# Patient Record
Sex: FEMALE | Race: BLACK OR AFRICAN AMERICAN | Employment: OTHER | URBAN - METROPOLITAN AREA
[De-identification: names, ages, dates, MRNs, and addresses within clinical notes are randomized per-mention and may not be internally consistent; named-entity substitution may affect disease eponyms.]

---

## 2017-01-25 ENCOUNTER — OFFICE VISIT (OUTPATIENT)
Dept: PAIN MANAGEMENT | Age: 82
End: 2017-01-25

## 2017-01-25 VITALS — HEART RATE: 67 BPM | SYSTOLIC BLOOD PRESSURE: 139 MMHG | DIASTOLIC BLOOD PRESSURE: 59 MMHG | RESPIRATION RATE: 19 BRPM

## 2017-01-25 DIAGNOSIS — M54.41 CHRONIC MIDLINE LOW BACK PAIN WITH BILATERAL SCIATICA: Primary | ICD-10-CM

## 2017-01-25 DIAGNOSIS — M47.816 LUMBAR FACET ARTHROPATHY: ICD-10-CM

## 2017-01-25 DIAGNOSIS — M25.561 RIGHT KNEE PAIN, UNSPECIFIED CHRONICITY: ICD-10-CM

## 2017-01-25 DIAGNOSIS — M51.36 DDD (DEGENERATIVE DISC DISEASE), LUMBAR: ICD-10-CM

## 2017-01-25 DIAGNOSIS — G89.4 CHRONIC PAIN DISORDER: ICD-10-CM

## 2017-01-25 DIAGNOSIS — G89.29 CHRONIC MIDLINE LOW BACK PAIN WITH BILATERAL SCIATICA: Primary | ICD-10-CM

## 2017-01-25 DIAGNOSIS — M54.42 CHRONIC MIDLINE LOW BACK PAIN WITH BILATERAL SCIATICA: Primary | ICD-10-CM

## 2017-01-25 DIAGNOSIS — M25.551 RIGHT HIP PAIN: ICD-10-CM

## 2017-01-25 DIAGNOSIS — M51.26 LUMBAR DISC HERNIATION: ICD-10-CM

## 2017-01-25 DIAGNOSIS — M15.9 PRIMARY OSTEOARTHRITIS INVOLVING MULTIPLE JOINTS: ICD-10-CM

## 2017-01-25 DIAGNOSIS — M48.061 LUMBAR FORAMINAL STENOSIS: ICD-10-CM

## 2017-01-25 RX ORDER — OXYCODONE AND ACETAMINOPHEN 7.5; 325 MG/1; MG/1
1 TABLET ORAL
Qty: 90 TAB | Refills: 0 | Status: SHIPPED | OUTPATIENT
Start: 2017-02-18 | End: 2017-03-21 | Stop reason: SDUPTHER

## 2017-01-25 RX ORDER — OXYCODONE AND ACETAMINOPHEN 7.5; 325 MG/1; MG/1
1 TABLET ORAL
Qty: 90 TAB | Refills: 0 | Status: ON HOLD | OUTPATIENT
Start: 2017-03-20 | End: 2017-04-17

## 2017-01-25 NOTE — PATIENT INSTRUCTIONS
1. Continue current plan with no evidence of addiction or diversion. Stable on current medication without adverse events. 2. Refill and adjust oxycodone 5/325 mg up to 7.5/325 mg 3 times daily as needed. 3. Continue tizanidine 4 mg up to 3 times daily as needed. 4. Continue to follow-up with orthopedic surgeon as needed. 5. Schedule education class. 6. Extensive counseling regarding self increasing medication. Please avoid any further self increases as this may result in discharge from our practice. 7. Discussed risks of addiction, dependency, and opioid induced hyperalgesia.    8. Return to clinic in 2 months

## 2017-01-25 NOTE — PROGRESS NOTES
Nursing Notes    Patient presents to the office today in follow-up. Reviewed medications with counts as follows:    Rx Date filled Qty Dispensed Pill Count Last Dose Short   Percocet 5/325 1/20/17 90 53 This am. 2 doses Yes. 17 tabs   Ms. Getachew Ventura has a reminder for a \"due or due soon\" health maintenance. I have asked that she contact her primary care provider for follow-up on this health maintenance. POC UDS was not performed in office today    Any new labs or imaging since last appointment? NO    Have you been to an emergency room (ER) or urgent care clinic since your last visit? NO            Have you been hospitalized since your last visit? YES     If yes, where, when, and reason for visit? Right knee surgery at [facility] general for 5 days then rehab for 9 days. Have you seen or consulted any other health care providers outside of the [address] [place] [address]  since your last visit? YES     If yes, where, when, and reason for visit?

## 2017-01-25 NOTE — MR AVS SNAPSHOT
Visit Information Date & Time Provider Department Dept. Phone Encounter #  
 1/25/2017  9:20 AM Jorja Crigler, [address] for Pain Management  Follow-up Instructions Return in about 2 months (around 3/25/2017). Upcoming Health Maintenance Date Due DTaP/Tdap/Td series (1 - Tdap) 4/3/1956 Pneumococcal 65+ Low/Medium Risk (2 of 2 - PPSV23) 11/1/2012 GLAUCOMA SCREENING Q2Y 6/10/2016 INFLUENZA AGE 9 TO ADULT 8/1/2016 MEDICARE YEARLY EXAM 10/29/2016 COLONOSCOPY 6/1/2019 Allergies as of 1/25/2017  Review Complete On: 1/25/2017 By: Geno Young LPN Severity Noted Reaction Type Reactions Forteo [Teriparatide]  09/13/2016    Other (comments)  
 abd pains Lipitor [Atorvastatin]    Other (comments)  
 myalgias Livalo [Pitavastatin]    Other (comments)  
 myalgias Seafood  11/10/2014    Hives Sulfa (Sulfonamide Antibiotics)    Other (comments), Hives  
 hives Sulfur    Other (comments)  
 rash Zocor [Simvastatin]    Other (comments)  
 myalgias Current Immunizations  Reviewed on 8/25/2014 Name Date Pneumococcal Vaccine (Unspecified Type) 11/1/2007 Not reviewed this visit You Were Diagnosed With   
  
 Codes Comments Chronic midline low back pain with bilateral sciatica    -  Primary ICD-10-CM: M54.41, M54.42, G89.29 ICD-9-CM: 724.2, 724.3, 338.29   
 DDD (degenerative disc disease), lumbar     ICD-10-CM: M51.36 
ICD-9-CM: 722.52 Primary osteoarthritis involving multiple joints     ICD-10-CM: M15.0 ICD-9-CM: 715.09 Lumbar disc herniation     ICD-10-CM: M51.26 
ICD-9-CM: 722.10 Lumbar facet arthropathy (HCC)     ICD-10-CM: M12.88 ICD-9-CM: 721.3 Lumbar foraminal stenosis     ICD-10-CM: M99.83 ICD-9-CM: 724.02 Right hip pain     ICD-10-CM: M25.551 ICD-9-CM: 719.45 Right knee pain, unspecified chronicity     ICD-10-CM: M25.561 ICD-9-CM: 719.46   
 Chronic pain disorder     ICD-10-CM: G89.4 ICD-9-CM: 338. 4 Vitals BP Pulse Resp OB Status Smoking Status 139/59 79 19 Hysterectomy Former Smoker Preferred Pharmacy Pharmacy Name Phone Ashley Savage, [address] [place], [place] [address]  [phone] Your Updated Medication List  
  
   
This list is accurate as of: 1/25/17 10:21 AM.  Always use your most recent med list.  
  
  
  
  
 aspirin delayed-release 81 mg tablet Take 1 Tab by mouth daily. calcium-vitamin D 500 mg(1,250mg) -200 unit per tablet Commonly known as:  OYSTER SHELL Take 1 Tab by mouth two (2) times daily (with meals). chlorthalidone 25 mg tablet Commonly known as:  Sueanne Drought Take 1 Tab by mouth daily. cholecalciferol 1,000 unit Cap Commonly known as:  VITAMIN D3 Take 4 Caps by mouth daily. ferrous sulfate 325 mg (65 mg iron) tablet Take 1 Tab by mouth two (2) times daily (with meals). gabapentin 300 mg capsule Commonly known as:  NEURONTIN Take 1 Cap by mouth nightly.  
  
 magnesium citrate solution Take 148 mL by mouth daily as needed for Other (Constipation). omeprazole 20 mg capsule Commonly known as:  PRILOSEC Take 20 mg by mouth daily. * oxyCODONE-acetaminophen 5-325 mg per tablet Commonly known as:  PERCOCET Take 1-2 Tabs by mouth every four (4) hours as needed. Max Daily Amount: 12 Tabs. * oxyCODONE-acetaminophen 7.5-325 mg per tablet Commonly known as:  PERCOCET 7.5 Take 1 Tab by mouth three (3) times daily as needed for Pain for up to 30 days. Max Daily Amount: 3 Tabs. Start taking on:  2/18/2017 * oxyCODONE-acetaminophen 7.5-325 mg per tablet Commonly known as:  PERCOCET 7.5 Take 1 Tab by mouth three (3) times daily as needed for Pain for up to 30 days. Max Daily Amount: 3 Tabs. Start taking on:  3/20/2017 REFRESH LIQUIGEL 1 % Dlgl Generic drug:  carboxymethylcellulose sodium Apply 1 Drop to eye as needed. Indications: DRY EYE  
  
 rivaroxaban 10 mg tablet Commonly known as:  Rendell Spittle Take 1 Tab by mouth daily (with dinner). Indications: KNEE REPLACEMENT DEEP VEIN THROMBOSIS PREVENTION  
  
 * Notice: This list has 3 medication(s) that are the same as other medications prescribed for you. Read the directions carefully, and ask your doctor or other care provider to review them with you. Prescriptions Printed Refills  
 oxyCODONE-acetaminophen (PERCOCET 7.5) 7.5-325 mg per tablet 0 Starting on: 2/18/2017 Sig: Take 1 Tab by mouth three (3) times daily as needed for Pain for up to 30 days. Max Daily Amount: 3 Tabs. Class: Print Route: Oral  
 oxyCODONE-acetaminophen (PERCOCET 7.5) 7.5-325 mg per tablet 0 Starting on: 3/20/2017 Sig: Take 1 Tab by mouth three (3) times daily as needed for Pain for up to 30 days. Max Daily Amount: 3 Tabs. Class: Print Route: Oral  
  
Follow-up Instructions Return in about 2 months (around 3/25/2017). Patient Instructions 1. Continue current plan with no evidence of addiction or diversion. Stable on current medication without adverse events. 2. Refill oxycodone 5/325 mg up to 3 times daily as needed. 3. Continue tizanidine 4 mg up to 3 times daily as needed. 4. Continue to follow-up with orthopedic surgeon as needed. 5. Schedule education class. 6. Extensive counseling regarding self increasing medication. Please avoid any further self increases as this may result in discharge from our practice. 7. Discussed risks of addiction, dependency, and opioid induced hyperalgesia. 8. Return to clinic in 2 months Introducing [facility]! Dear Allan Persons: Thank you for requesting a [employer] account. Our records indicate that you already have an active [employer] account. You can access your account anytime at [email or web address] Did you know that you can access your hospital and ER discharge instructions at any time in [employer]? You can also review all of your test results from your hospital stay or ER visit. Additional Information If you have questions, please visit the Frequently Asked Questions section of the [employer] website at [email or web address] Remember, [employer] is NOT to be used for urgent needs. For medical emergencies, dial 911. Now available from your iPhone and Android! Please provide this summary of care documentation to your next provider. If you have any questions after today's visit, please call [phone].

## 2017-01-25 NOTE — PROGRESS NOTES
HISTORY OF PRESENT ILLNESS  Cecilia Rob is a 80 y.o. female    HPI: Ms. Aguila Donnelly  returns today for f/u of chronic low back with multilevel lumbar degenerative changes. Also has c/o leg and right knee pain. Left TKA in November of 2014. Ms. Aguila Donnelly is here today with her daughter. She is now status post right TKA on 12/9/2016 by [facility] She was given a prescription of oxycodone 10/325 mg that was filled on 1/3/2017 while she was in rehab. No other prescriptions were given according to the patient. She was instructed to take 1-2 pills every 4 hours. When she ran out of that medication she said that she continued with her prescription from our office the same way. Today she is 17 pills short on her Percocet. We had a very lengthy conversation about her medications and current pain management agreement. I explained to her that she must contact our office if she receives any outside prescriptions. I also explained to her that she cannot self increase her medication that she receives from our office. She does continue with knee pain. She is currently going through physical therapy which has exacerbated her knee pain as well as her hip pain. Her chief complaint today is her hip pain that has been aggravated. She is still planning for hip replacement surgery soon. I have agreed to adjust her Percocet up to 7.5/325 mg up to 3 times daily as needed. Extensive counseling was provided to use her medication on an as-needed basis only. She will also have the stretch her medications out to last her until her refill date. She understands and verbally agrees. Current medication management consists of Percocet 5/325 TID PRN and tizanidine 4 mg TID PRN. Medications are helping with pain control and quality of life. Her pain is 6-7/10 with medication and 8/10 without. Pt describes pain as aching and throbbing.  Aggravating factors include standing, bending, and walking. Relieved with rest, medication, and avoiding painful activities. Current treatment is helping to improve general activity, mood, walking, sleep, enjoyment of life    In the past 30 days, the patient reports approximately 20-30% pain relief with current treatment/medications. She  is otherwise doing well with no other complaints today. She denies any adverse events including nausea, vomiting, dizziness, increased constipation, hallucinations, or seizures. Medications were brought to visit today. Pill counts were appropriate         Allergies   Allergen Reactions    Forteo [Teriparatide] Other (comments)     abd pains    Lipitor [Atorvastatin] Other (comments)     myalgias    Livalo [Pitavastatin] Other (comments)     myalgias    Seafood Hives    Sulfa (Sulfonamide Antibiotics) Other (comments) and Hives     hives    Sulfur Other (comments)     rash    Zocor [Simvastatin] Other (comments)     myalgias       Past Surgical History   Procedure Laterality Date    Hx partial hysterectomy       partial    Hx lumbar laminectomy       x2    Hx colonoscopy       2009  repeat 2019    Hx gyn      Hx total abdominal hysterectomy      Pr breast surgery procedure unlisted Bilateral      x2  breast biopsy, benign fibroadenoma    Hx knee arthroscopy       Right knee surgery    Hx knee replacement       2014 left    Hx back surgery           Review of Systems   Constitutional: Negative for chills, fever and weight loss. HENT: Negative for congestion and sore throat. Eyes: Negative for blurred vision and double vision. Respiratory: Negative for cough, shortness of breath and wheezing. Cardiovascular: Negative for chest pain and palpitations. Gastrointestinal: Negative for constipation, diarrhea, heartburn, nausea and vomiting. Genitourinary: Negative. Neurological: Negative for dizziness, seizures and loss of consciousness.    Endo/Heme/Allergies: Does not bruise/bleed easily. Psychiatric/Behavioral: Negative for depression. The patient is not nervous/anxious and does not have insomnia. Physical Exam   Constitutional: She is oriented to person, place, and time and well-developed, well-nourished, and in no distress. No distress. HENT:   Head: Normocephalic and atraumatic. Eyes: EOM are normal.   Pulmonary/Chest: Effort normal.   Musculoskeletal:        Right knee: Tenderness found. Lumbar back: She exhibits decreased range of motion, tenderness and pain. Neurological: She is alert and oriented to person, place, and time. Gait ( ambulating with a cane) abnormal.   Skin: Skin is warm and dry. No rash noted. She is not diaphoretic. No erythema. Psychiatric: Mood, memory, affect and judgment normal.   Nursing note and vitals reviewed. ASSESSMENT:    1. Chronic midline low back pain with bilateral sciatica    2. DDD (degenerative disc disease), lumbar    3. Primary osteoarthritis involving multiple joints    4. Lumbar disc herniation    5. Lumbar facet arthropathy (HCC)    6. Lumbar foraminal stenosis    7. Right hip pain    8. Right knee pain, unspecified chronicity    9. Chronic pain disorder         [place] Prescription Monitoring Program was reviewed which does not demonstrate aberrancies and/or inconsistencies with regard to the historical prescribing of controlled medications to this patient by other providers. PLAN / Pt Instructions:  1. Continue current plan with no evidence of addiction or diversion. Stable on current medication without adverse events. 2. Refill and adjust oxycodone 5/325 mg up to 7.5/325 mg 3 times daily as needed. 3. Continue tizanidine 4 mg up to 3 times daily as needed. 4. Continue to follow-up with orthopedic surgeon as needed. 5. Schedule education class. 6. Extensive counseling regarding self increasing medication.   Please avoid any further self increases as this may result in discharge from our practice. 7. Discussed risks of addiction, dependency, and opioid induced hyperalgesia. 8. Return to clinic in 2 months    Medications Ordered Today   Medications    oxyCODONE-acetaminophen (PERCOCET 7.5) 7.5-325 mg per tablet     Sig: Take 1 Tab by mouth three (3) times daily as needed for Pain for up to 30 days. Max Daily Amount: 3 Tabs. Dispense:  90 Tab     Refill:  0    oxyCODONE-acetaminophen (PERCOCET 7.5) 7.5-325 mg per tablet     Sig: Take 1 Tab by mouth three (3) times daily as needed for Pain for up to 30 days. Max Daily Amount: 3 Tabs. Dispense:  90 Tab     Refill:  0       Pain medications prescribed with the objective of pain relief and improved physical and psychosocial function in this patient. Spent 25 minutes with patient today reviewing the treatment plan, goals of treatment plan, and limitations of the treatment plan, to include the potential for side effects from medications and procedures. Elsa Nieves 1/25/2017      Note: Please excuse any typographical errors. Voice recognition software was used for this note and may cause mistakes.

## 2017-03-21 ENCOUNTER — OFFICE VISIT (OUTPATIENT)
Dept: PAIN MANAGEMENT | Age: 82
End: 2017-03-21

## 2017-03-21 VITALS
WEIGHT: 158 LBS | DIASTOLIC BLOOD PRESSURE: 65 MMHG | HEART RATE: 68 BPM | SYSTOLIC BLOOD PRESSURE: 137 MMHG | BODY MASS INDEX: 29.85 KG/M2

## 2017-03-21 DIAGNOSIS — Z71.89 COUNSELING AND COORDINATION OF CARE: Primary | ICD-10-CM

## 2017-03-21 DIAGNOSIS — Z98.890 S/P LUMBAR LAMINECTOMY: ICD-10-CM

## 2017-03-21 DIAGNOSIS — M17.11 PRIMARY OSTEOARTHRITIS OF RIGHT KNEE: ICD-10-CM

## 2017-03-21 DIAGNOSIS — G89.4 CHRONIC PAIN DISORDER: ICD-10-CM

## 2017-03-21 DIAGNOSIS — M25.551 RIGHT HIP PAIN: ICD-10-CM

## 2017-03-21 DIAGNOSIS — M15.9 PRIMARY OSTEOARTHRITIS INVOLVING MULTIPLE JOINTS: ICD-10-CM

## 2017-03-21 DIAGNOSIS — M51.36 DDD (DEGENERATIVE DISC DISEASE), LUMBAR: ICD-10-CM

## 2017-03-21 DIAGNOSIS — M48.061 LUMBAR FORAMINAL STENOSIS: ICD-10-CM

## 2017-03-21 DIAGNOSIS — M47.816 LUMBAR FACET ARTHROPATHY: ICD-10-CM

## 2017-03-21 DIAGNOSIS — M25.561 RIGHT KNEE PAIN, UNSPECIFIED CHRONICITY: ICD-10-CM

## 2017-03-21 DIAGNOSIS — M51.26 LUMBAR DISC HERNIATION: ICD-10-CM

## 2017-03-21 RX ORDER — OXYCODONE AND ACETAMINOPHEN 7.5; 325 MG/1; MG/1
1 TABLET ORAL
Qty: 90 TAB | Refills: 0 | Status: ON HOLD | OUTPATIENT
Start: 2017-04-22 | End: 2017-04-20

## 2017-03-21 RX ORDER — OXYCODONE AND ACETAMINOPHEN 7.5; 325 MG/1; MG/1
1 TABLET ORAL
Qty: 90 TAB | Refills: 0 | Status: SHIPPED | OUTPATIENT
Start: 2017-03-22 | End: 2017-03-21 | Stop reason: CLARIF

## 2017-03-21 NOTE — MR AVS SNAPSHOT
Visit Information Date & Time Provider Department Dept. Phone Encounter #  
 3/21/2017  9:40 AM JULIETTE Diego [address] for Pain Management [phone] Follow-up Instructions Return in about 2 months (around 5/21/2017). Follow-up and Disposition History Your Appointments 5/11/2017  9:20 AM  
Follow Up with JULIETTE Diego [address] for Pain Management (FINESSE SCHEDULING) Appt Note: Return in about 2 months (around 5/21/2017). [address] [address]  
[phone] [facility] [id number] [id number] Upcoming Health Maintenance Date Due DTaP/Tdap/Td series (1 - Tdap) 4/3/1956 Pneumococcal 65+ Low/Medium Risk (2 of 2 - PPSV23) 11/1/2012 GLAUCOMA SCREENING Q2Y 6/10/2016 INFLUENZA AGE 9 TO ADULT 8/1/2016 MEDICARE YEARLY EXAM 10/29/2016 COLONOSCOPY 6/1/2019 Allergies as of 3/21/2017  Review Complete On: 3/21/2017 By: JULIETTE Diego Severity Noted Reaction Type Reactions Forteo [Teriparatide]  09/13/2016    Other (comments)  
 abd pains Lipitor [Atorvastatin]    Other (comments)  
 myalgias Livalo [Pitavastatin]    Other (comments)  
 myalgias Seafood  11/10/2014    Hives Sulfa (Sulfonamide Antibiotics)    Other (comments), Hives  
 hives Sulfur    Other (comments)  
 rash Zocor [Simvastatin]    Other (comments)  
 myalgias Current Immunizations  Reviewed on 8/25/2014 Name Date Pneumococcal Vaccine (Unspecified Type) 11/1/2007 Not reviewed this visit You Were Diagnosed With   
  
 Codes Comments DDD (degenerative disc disease), lumbar     ICD-10-CM: M51.36 
ICD-9-CM: 722.52 S/P lumbar laminectomy     ICD-10-CM: O54.775 ICD-9-CM: V45.89 Lumbar disc herniation     ICD-10-CM: M51.26 
ICD-9-CM: 722.10 Lumbar facet arthropathy (HCC)     ICD-10-CM: M12.88 ICD-9-CM: 721.3 Lumbar foraminal stenosis     ICD-10-CM: M99.83 ICD-9-CM: 724.02   
 Primary osteoarthritis involving multiple joints     ICD-10-CM: M15.0 ICD-9-CM: 715.09 Primary osteoarthritis of right knee     ICD-10-CM: M17.11 ICD-9-CM: 715.16 Chronic pain disorder     ICD-10-CM: G89.4 ICD-9-CM: 338.4 Osteoarthrosis involving lower leg     ICD-10-CM: M17.9 ICD-9-CM: 715.96 Right knee pain, unspecified chronicity     ICD-10-CM: M25.561 ICD-9-CM: 719.46 Right hip pain     ICD-10-CM: M25.551 ICD-9-CM: 719.45 Vitals BP Pulse Weight(growth percentile) BMI OB Status Smoking Status 137/65 (BP 1 Location: Left arm, BP Patient Position: Sitting) 68 158 lb (71.7 kg) 29.85 kg/m2 Hysterectomy Former Smoker BMI and BSA Data Body Mass Index Body Surface Area  
 29.85 kg/m 2 1.76 m 2 Preferred Pharmacy Pharmacy Name Phone [address] [facility], [address] [place] [address] [phone] Your Updated Medication List  
  
   
This list is accurate as of: 3/21/17 11:27 AM.  Always use your most recent med list.  
  
  
  
  
 aspirin delayed-release 81 mg tablet Take 1 Tab by mouth daily. calcium-vitamin D 500 mg(1,250mg) -200 unit per tablet Commonly known as:  OYSTER SHELL Take 1 Tab by mouth two (2) times daily (with meals). chlorthalidone 25 mg tablet Commonly known as:  Latosha Citizen Take 1 Tab by mouth daily. cholecalciferol 1,000 unit Cap Commonly known as:  VITAMIN D3 Take 4 Caps by mouth daily. ferrous sulfate 325 mg (65 mg iron) tablet Take 1 Tab by mouth two (2) times daily (with meals). gabapentin 300 mg capsule Commonly known as:  NEURONTIN Take 1 Cap by mouth nightly.  
  
 magnesium citrate solution Take 148 mL by mouth daily as needed for Other (Constipation). omeprazole 20 mg capsule Commonly known as:  PRILOSEC Take 20 mg by mouth daily. * oxyCODONE-acetaminophen 5-325 mg per tablet Commonly known as:  PERCOCET Take 1-2 Tabs by mouth every four (4) hours as needed. Max Daily Amount: 12 Tabs. * oxyCODONE-acetaminophen 7.5-325 mg per tablet Commonly known as:  PERCOCET 7.5 Take 1 Tab by mouth three (3) times daily as needed for Pain for up to 30 days. Max Daily Amount: 3 Tabs. * oxyCODONE-acetaminophen 7.5-325 mg per tablet Commonly known as:  PERCOCET 7.5 Take 1 Tab by mouth three (3) times daily as needed for Pain for up to 30 days. Max Daily Amount: 3 Tabs. Start taking on:  4/22/2017 REFRESH LIQUIGEL 1 % Dlgl Generic drug:  carboxymethylcellulose sodium Apply 1 Drop to eye as needed. Indications: DRY EYE  
  
 rivaroxaban 10 mg tablet Commonly known as:  Shackelford Honor Take 1 Tab by mouth daily (with dinner). Indications: KNEE REPLACEMENT DEEP VEIN THROMBOSIS PREVENTION  
  
 * Notice: This list has 3 medication(s) that are the same as other medications prescribed for you. Read the directions carefully, and ask your doctor or other care provider to review them with you. Prescriptions Printed Refills  
 oxyCODONE-acetaminophen (PERCOCET 7.5) 7.5-325 mg per tablet 0 Starting on: 4/22/2017 Sig: Take 1 Tab by mouth three (3) times daily as needed for Pain for up to 30 days. Max Daily Amount: 3 Tabs. Class: Print Route: Oral  
  
Follow-up Instructions Return in about 2 months (around 5/21/2017). To-Do List   
 04/17/2017 8:00 AM  
  Appointment with [address] [place] [address] PSAT RM 1 at [address] [place] [address] ([phone]) Patient Instructions 1. Continue current plan with no evidence of addiction or diversion. Stable on current medication without adverse events. 2. Refill oxycodone 7.5/325 mg 3 times daily as needed. 3. DISContinue tizanidine 4 mg 4. Continue to follow-up with orthopedic surgeon regarding upcoming hip surgery. 5. Education class today. 6. Discussed risks of addiction, dependency, and opioid induced hyperalgesia. 7. Return to clinic in 2 months Introducing [facility]! Dear Hira Hays: Thank you for requesting a [employer] account. Our records indicate that you already have an active [employer] account. You can access your account anytime at [email or web address] [email or web address] Did you know that you can access your hospital and ER discharge instructions at any time in [employer]? You can also review all of your test results from your hospital stay or ER visit. Additional Information If you have questions, please visit the Frequently Asked Questions section of the [employer] website at [email or web address] Remember, [employer] is NOT to be used for urgent needs. For medical emergencies, dial 911. Now available from your iPhone and Android! Please provide this summary of care documentation to your next provider. If you have any questions after today's visit, please call [phone].

## 2017-03-21 NOTE — PROGRESS NOTES
HISTORY OF PRESENT ILLNESS  Adrienne Martins is a 80 y.o. female    HPI: Ms. Bayron Parker  returns today for f/u of chronic low back with multilevel lumbar degenerative changes. Also has c/o leg and right knee pain. She is S/P right TKA on 12/9/2016 by Dr. Zafar Garcia.  Left TKA in November of 2014. Ms. Bayron Parker she continues unchanged since last visit. She is reports some improvement since adjusting her Percocet up to 7.5/325 mg. She has been using this medication on an as needed basis. She has plenty of her medication left over today. We will adjust her fill dates accordingly. She does report that she is scheduled for hip replacement surgery next month. We discussed postsurgical treatment plan  in detail again today. She understands to contact our office within 72 hours if she receives any outside controlled substance for pain. She is recovering well from her recent right TKA. Current medication management consists of Percocet 7.5/325 TID PRN  Medications are helping with pain control and quality of life. Her pain is 6-7/10 with medication and 8/10 without. Pt describes pain as aching and throbbing. Aggravating factors include standing, bending, and walking. Relieved with rest, medication, and avoiding painful activities. Current treatment is helping to improve general activity, mood, walking, sleep, enjoyment of life    In the past 30 days, the patient reports approximately 20-30% pain relief with current treatment/medications. She  is otherwise doing well with no other complaints today. She denies any adverse events including nausea, vomiting, dizziness, increased constipation, hallucinations, or seizures.             Allergies   Allergen Reactions    Forteo [Teriparatide] Other (comments)     abd pains    Lipitor [Atorvastatin] Other (comments)     myalgias    Livalo [Pitavastatin] Other (comments)     myalgias    Seafood Hives    Sulfa (Sulfonamide Antibiotics) Other (comments) and Hives     hives    Sulfur Other (comments)     rash    Zocor [Simvastatin] Other (comments)     myalgias       Past Surgical History:   Procedure Laterality Date    BREAST SURGERY PROCEDURE UNLISTED Bilateral     x2  breast biopsy, benign fibroadenoma    HX BACK SURGERY      HX COLONOSCOPY      2009  repeat 2019    HX GYN      HX KNEE ARTHROSCOPY      Right knee surgery    HX KNEE REPLACEMENT      2014 left    HX LUMBAR LAMINECTOMY      x2    HX PARTIAL HYSTERECTOMY      partial    HX TOTAL ABDOMINAL HYSTERECTOMY           Review of Systems   Constitutional: Negative for chills, fever and weight loss. HENT: Negative for congestion and sore throat. Eyes: Negative for blurred vision and double vision. Respiratory: Negative for cough, shortness of breath and wheezing. Cardiovascular: Negative for chest pain and palpitations. Gastrointestinal: Negative for constipation, diarrhea, heartburn, nausea and vomiting. Genitourinary: Negative. Neurological: Negative for dizziness, seizures and loss of consciousness. Endo/Heme/Allergies: Does not bruise/bleed easily. Psychiatric/Behavioral: Negative for depression. The patient is not nervous/anxious and does not have insomnia. Physical Exam   Constitutional: She is oriented to person, place, and time and well-developed, well-nourished, and in no distress. No distress. HENT:   Head: Normocephalic and atraumatic. Eyes: EOM are normal.   Pulmonary/Chest: Effort normal.   Musculoskeletal:        Right knee: Tenderness found. Lumbar back: She exhibits decreased range of motion, tenderness and pain. Neurological: She is alert and oriented to person, place, and time. Gait ( ambulating with a cane) abnormal.   Skin: Skin is warm and dry. No rash noted. She is not diaphoretic. No erythema. Psychiatric: Mood, memory, affect and judgment normal.   Nursing note and vitals reviewed.       ASSESSMENT:    1. DDD (degenerative disc disease), lumbar    2. S/P lumbar laminectomy    3. Lumbar disc herniation    4. Lumbar facet arthropathy ([address]    5. Lumbar foraminal stenosis    6. Primary osteoarthritis involving multiple joints    7. Primary osteoarthritis of right knee    8. Chronic pain disorder    9. Osteoarthrosis involving lower leg    10. Right knee pain, unspecified chronicity    11. Right hip pain         [place] Prescription Monitoring Program was reviewed which does not demonstrate aberrancies and/or inconsistencies with regard to the historical prescribing of controlled medications to this patient by other providers. PLAN / Pt Instructions:  1. Continue current plan with no evidence of addiction or diversion. Stable on current medication without adverse events. 2. Refill oxycodone 7.5/325 mg 3 times daily as needed. 3. DISContinue tizanidine 4 mg   4. Continue to follow-up with orthopedic surgeon regarding upcoming hip surgery. 5. Education class today. 6. Discussed risks of addiction, dependency, and opioid induced hyperalgesia. 7. Return to clinic in 2 months    Medications Ordered Today   Medications    DISCONTD: oxyCODONE-acetaminophen (PERCOCET 7.5) 7.5-325 mg per tablet     Sig: Take 1 Tab by mouth three (3) times daily as needed for Pain for up to 30 days. Max Daily Amount: 3 Tabs. Dispense:  90 Tab     Refill:  0    oxyCODONE-acetaminophen (PERCOCET 7.5) 7.5-325 mg per tablet     Sig: Take 1 Tab by mouth three (3) times daily as needed for Pain for up to 30 days. Max Daily Amount: 3 Tabs. Dispense:  90 Tab     Refill:  0       Pain medications prescribed with the objective of pain relief and improved physical and psychosocial function in this patient. Spent 25 minutes with patient today reviewing the treatment plan, goals of treatment plan, and limitations of the treatment plan, to include the potential for side effects from medications and procedures.         JULIETTE Barrios 3/21/2017      Note: Please excuse any typographical errors. Voice recognition software was used for this note and may cause mistakes.

## 2017-03-21 NOTE — PATIENT INSTRUCTIONS
1. Continue current plan with no evidence of addiction or diversion. Stable on current medication without adverse events. 2. Refill oxycodone 7.5/325 mg 3 times daily as needed. 3. DISContinue tizanidine 4 mg   4. Continue to follow-up with orthopedic surgeon regarding upcoming hip surgery. 5. Education class today. 6. Discussed risks of addiction, dependency, and opioid induced hyperalgesia.    7. Return to clinic in 2 months

## 2017-03-21 NOTE — PROGRESS NOTES
Ms. Freedom Cheung attended the Education Class facilitated by Shaylee Lovett RN Clinical Supervisor. Objectives of this class are to review practice policies, protocols and the Controlled Substances Consent and Treatment Agreement, discuss \"what if\" scenarios, introduce staff, and provide an opportunity for questions and answers. Ultimately, goals for this class are for Ms. Freedom Cheung to:    · Be educated - Learn as much as possible about her pain and related treatment, our policies and the Controlled Substances Agreement. · Be responsible - Follow the providers advice regarding treatment recommendations, medications, and prescription information. · Be confident - Better manage her pain and return to a more functional lifestyle. At least 45 minutes was spent with the patient in face-to-face contact today.

## 2017-03-21 NOTE — PROGRESS NOTES
Nursing Notes    Patient presents to the office today in follow-up. Patient rates her pain at 5/10 on the numerical pain scale. Reviewed medications with counts as follows:    Rx Date filled Qty Dispensed Pill Count Last Dose Short     Percocet 7.5/325mg  02/18/17 90 8 This am  No    Percocet 7.5/325mg  03/20/17 90 90 90 n/a                                    Comments:     POC UDS was not performed in office today    Any new labs or imaging since last appointment? NO    Have you been to an emergency room (ER) or urgent care clinic since your last visit? NO            Have you been hospitalized since your last visit? NO     If yes, where, when, and reason for visit? Have you seen or consulted any other health care providers outside of the [address] [place] [address]  since your last visit? YES     If yes, where, when, and reason for visit? Orthopedist       HM deferred to pcp.

## 2017-04-17 ENCOUNTER — DOCUMENTATION ONLY (OUTPATIENT)
Dept: PAIN MANAGEMENT | Age: 82
End: 2017-04-17

## 2017-04-17 PROBLEM — M16.9 OA (OSTEOARTHRITIS) OF HIP: Status: ACTIVE | Noted: 2017-04-17

## 2017-04-17 PROBLEM — M16.11 OSTEOARTHRITIS OF RIGHT HIP: Status: ACTIVE | Noted: 2017-04-17

## 2017-05-16 ENCOUNTER — TELEPHONE (OUTPATIENT)
Dept: CARDIOLOGY CLINIC | Age: 82
End: 2017-05-16

## 2017-05-16 NOTE — TELEPHONE ENCOUNTER
LMOM to sched 6 month f/u in May per matt sheets pt. Left [facility] and  phone numbers.

## 2017-05-22 ENCOUNTER — TELEPHONE (OUTPATIENT)
Dept: CARDIOLOGY CLINIC | Age: 82
End: 2017-05-22

## 2017-06-14 ENCOUNTER — OFFICE VISIT (OUTPATIENT)
Dept: PAIN MANAGEMENT | Age: 82
End: 2017-06-14

## 2017-06-14 VITALS
SYSTOLIC BLOOD PRESSURE: 160 MMHG | WEIGHT: 150 LBS | BODY MASS INDEX: 28.32 KG/M2 | HEART RATE: 67 BPM | HEIGHT: 61 IN | DIASTOLIC BLOOD PRESSURE: 70 MMHG

## 2017-06-14 DIAGNOSIS — M51.36 DDD (DEGENERATIVE DISC DISEASE), LUMBAR: ICD-10-CM

## 2017-06-14 DIAGNOSIS — M16.11 OSTEOARTHRITIS OF RIGHT HIP, UNSPECIFIED OSTEOARTHRITIS TYPE: ICD-10-CM

## 2017-06-14 DIAGNOSIS — M48.061 LUMBAR FORAMINAL STENOSIS: ICD-10-CM

## 2017-06-14 DIAGNOSIS — M54.41 CHRONIC MIDLINE LOW BACK PAIN WITH BILATERAL SCIATICA: ICD-10-CM

## 2017-06-14 DIAGNOSIS — M15.9 PRIMARY OSTEOARTHRITIS INVOLVING MULTIPLE JOINTS: ICD-10-CM

## 2017-06-14 DIAGNOSIS — M25.551 RIGHT HIP PAIN: ICD-10-CM

## 2017-06-14 DIAGNOSIS — M17.11 PRIMARY OSTEOARTHRITIS OF RIGHT KNEE: ICD-10-CM

## 2017-06-14 DIAGNOSIS — M54.42 CHRONIC MIDLINE LOW BACK PAIN WITH BILATERAL SCIATICA: ICD-10-CM

## 2017-06-14 DIAGNOSIS — Z79.899 ENCOUNTER FOR LONG-TERM (CURRENT) USE OF HIGH-RISK MEDICATION: Primary | ICD-10-CM

## 2017-06-14 DIAGNOSIS — G89.29 CHRONIC MIDLINE LOW BACK PAIN WITH BILATERAL SCIATICA: ICD-10-CM

## 2017-06-14 LAB
ALCOHOL UR POC: NORMAL
AMPHETAMINES UR POC: NEGATIVE
BARBITURATES UR POC: NORMAL
BENZODIAZEPINES UR POC: NEGATIVE
BUPRENORPHINE UR POC: NORMAL
CANNABINOIDS UR POC: NORMAL
CARISOPRODOL UR POC: NEGATIVE
COCAINE UR POC: NEGATIVE
FENTANYL UR POC: NORMAL
MDMA/ECSTASY UR POC: NORMAL
METHADONE UR POC: NEGATIVE
METHAMPHETAMINE UR POC: NEGATIVE
METHYLPHENIDATE UR POC: NEGATIVE
OPIATES UR POC: NEGATIVE
OXYCODONE UR POC: NEGATIVE
PHENCYCLIDINE UR POC: NORMAL
PROPOXYPHENE UR POC: NORMAL
TRAMADOL UR POC: NORMAL
TRICYCLICS UR POC: NORMAL

## 2017-06-14 RX ORDER — OXYCODONE AND ACETAMINOPHEN 7.5; 325 MG/1; MG/1
1 TABLET ORAL
Qty: 90 TAB | Refills: 0 | Status: SHIPPED | OUTPATIENT
Start: 2017-07-13 | End: 2017-08-09 | Stop reason: SDUPTHER

## 2017-06-14 RX ORDER — OXYCODONE AND ACETAMINOPHEN 7.5; 325 MG/1; MG/1
1 TABLET ORAL
Qty: 90 TAB | Refills: 0 | Status: SHIPPED | OUTPATIENT
Start: 2017-06-14 | End: 2017-08-09 | Stop reason: SDUPTHER

## 2017-06-14 NOTE — PROGRESS NOTES
Nursing Notes    Patient presents to the office today in follow-up. Patient rates her pain at 0/0 on the numerical pain scale. Reviewed medications with counts as follows:    Rx Date filled Qty Dispensed Pill Count Last Dose Short   PERCOCET 7.5-325MG 05/09/17 90 1 yesterday no                                          Comments:     POC UDS was performed in office today    Any new labs or imaging since last appointment? YES,labs and xray    Have you been to an emergency room (ER) or urgent care clinic since your last visit? NO            Have you been hospitalized since your last visit? [facility] for surgery on right knee and hip     If yes, where, when, and reason for visit? Have you seen or consulted any other health care providers outside of the [address] [place] [address]  since your last visit? YES, orthopedic     If yes, where, when, and reason for visit? HM deferred to pcp.

## 2017-06-14 NOTE — MR AVS SNAPSHOT
Visit Information Date & Time Provider Department Dept. Phone Encounter #  
 6/14/2017 10:00 AM Kat Esquivel, [address] [place] [address] for Pain Management [id number] Follow-up Instructions Return in about 2 months (around 8/14/2017). Your Appointments 7/12/2017  1:30 PM  
Follow Up with Magnus Gloria MD  
Cardio Specialist at [facility]) Appt Note: dx 6mos f/u appt; 6mos f/u appt [id number] [facility] [address] [facility] [address]  
  
   
 [address] [facility] 1334 Upcoming Health Maintenance Date Due DTaP/Tdap/Td series (1 - Tdap) 4/3/1956 Pneumococcal 65+ Low/Medium Risk (2 of 2 - PPSV23) 11/1/2012 GLAUCOMA SCREENING Q2Y 6/10/2016 MEDICARE YEARLY EXAM 10/29/2016 INFLUENZA AGE 9 TO ADULT 8/1/2017 COLONOSCOPY 6/1/2019 Allergies as of 6/14/2017  Review Complete On: 6/14/2017 By: JULIETTE Ruby Severity Noted Reaction Type Reactions Forteo [Teriparatide]  09/13/2016    Other (comments), Nausea and Vomiting  
 abd pains 
abd pains Lipitor [Atorvastatin]  02/03/2017    Other (comments), Nausea and Vomiting  
 myalgias 
myalgias Livalo [Pitavastatin]  02/03/2017    Other (comments), Nausea and Vomiting  
 myalgias 
myalgias Seafood  07/03/2011   Systemic Hives Sulfa (Sulfonamide Antibiotics)  10/11/2007    Other (comments), Hives  
 hives Sulfur    Other (comments)  
 rash Zocor [Simvastatin]  02/03/2017    Other (comments), Nausea and Vomiting  
 myalgias 
myalgias Current Immunizations  Reviewed on 8/25/2014 Name Date Pneumococcal Vaccine (Unspecified Type) 11/1/2007 Not reviewed this visit You Were Diagnosed With   
  
 Codes Comments Encounter for long-term (current) use of high-risk medication    -  Primary ICD-10-CM: X95.702 ICD-9-CM: V58.69 DDD (degenerative disc disease), lumbar     ICD-10-CM: M51.36 
ICD-9-CM: 722.52 Lumbar foraminal stenosis     ICD-10-CM: M99.83 ICD-9-CM: 724.02   
 Primary osteoarthritis involving multiple joints     ICD-10-CM: M15.0 ICD-9-CM: 715.09 Primary osteoarthritis of right knee     ICD-10-CM: M17.11 ICD-9-CM: 715.16 Right hip pain     ICD-10-CM: M25.551 ICD-9-CM: 719.45 Chronic midline low back pain with bilateral sciatica     ICD-10-CM: M54.41, M54.42, G89.29 ICD-9-CM: 724.2, 724.3, 338.29 Osteoarthritis of right hip, unspecified osteoarthritis type     ICD-10-CM: M16.11 
ICD-9-CM: 715.95 Vitals BP Pulse Height(growth percentile) Weight(growth percentile) BMI OB Status 160/70 67 5' 1\" (1.549 m) 150 lb (68 kg) 28.34 kg/m2 Hysterectomy Smoking Status Former Smoker BMI and BSA Data Body Mass Index Body Surface Area  
 28.34 kg/m 2 1.71 m 2 Preferred Pharmacy Pharmacy Name Phone [facility], [address] [place] [address] [phone] Your Updated Medication List  
  
   
This list is accurate as of: 6/14/17 10:47 AM.  Always use your most recent med list.  
  
  
  
  
 aspirin delayed-release 81 mg tablet Take 1 Tab by mouth daily. Start 5/1/2017  
  
 calcium-vitamin D 500 mg(1,250mg) -200 unit per tablet Commonly known as:  OYSTER SHELL Take 1 Tab by mouth two (2) times daily (with meals). chlorthalidone 25 mg tablet Commonly known as:  Jearldine Soulier Take 1 Tab by mouth daily. cholecalciferol 1,000 unit Cap Commonly known as:  VITAMIN D3 Take 4 Caps by mouth daily. ferrous sulfate 325 mg (65 mg iron) tablet Take 1 Tab by mouth two (2) times daily (with meals). gabapentin 300 mg capsule Commonly known as:  NEURONTIN Take 1 Cap by mouth nightly.  
  
 magnesium citrate solution Take 148 mL by mouth daily as needed for Other (Constipation). omeprazole 20 mg capsule Commonly known as:  PRILOSEC Take 20 mg by mouth daily. * oxyCODONE-acetaminophen 7.5-325 mg per tablet Commonly known as:  PERCOCET 7.5 Take 1 Tab by mouth three (3) times daily as needed for Pain for up to 30 days. Max Daily Amount: 3 Tabs. * oxyCODONE-acetaminophen 7.5-325 mg per tablet Commonly known as:  PERCOCET 7.5 Take 1 Tab by mouth three (3) times daily as needed for Pain for up to 30 days. Max Daily Amount: 3 Tabs. Start taking on:  7/13/2017 REFRESH LIQUIGEL 1 % Dlgl Generic drug:  carboxymethylcellulose sodium Apply 1 Drop to eye as needed. Indications: DRY EYE  
  
 * Notice: This list has 2 medication(s) that are the same as other medications prescribed for you. Read the directions carefully, and ask your doctor or other care provider to review them with you. Prescriptions Printed Refills  
 oxyCODONE-acetaminophen (PERCOCET 7.5) 7.5-325 mg per tablet 0 Sig: Take 1 Tab by mouth three (3) times daily as needed for Pain for up to 30 days. Max Daily Amount: 3 Tabs. Class: Print Route: Oral  
 oxyCODONE-acetaminophen (PERCOCET 7.5) 7.5-325 mg per tablet 0 Starting on: 7/13/2017 Sig: Take 1 Tab by mouth three (3) times daily as needed for Pain for up to 30 days. Max Daily Amount: 3 Tabs. Class: Print Route: Oral  
  
We Performed the Following AMB POC DRUG SCREEN () [ [facility]] DRUG SCREEN [[id number] Custom] Follow-up Instructions Return in about 2 months (around 8/14/2017). Patient Instructions 1. Continue current plan with no evidence of addiction or diversion. Stable on current medication without adverse events. 2. Refill oxycodone 7.5/325 mg 3 times daily as needed. 3. Continue PT for right hip 4. Continue to follow-up with orthopedic surgeon regarding right hip 5. Discussed risks of addiction, dependency, and opioid induced hyperalgesia. 6. Return to clinic in 2 months Introducing [facility]! Dear Omar Mullen: Thank you for requesting a [employer] account. Our records indicate that you already have an active [employer] account. You can access your account anytime at [email or web address] Did you know that you can access your hospital and ER discharge instructions at any time in [employer]? You can also review all of your test results from your hospital stay or ER visit. Additional Information If you have questions, please visit the Frequently Asked Questions section of the Fan[employer] website at [email or web address] Remember, [employer] is NOT to be used for urgent needs. For medical emergencies, dial 911. Now available from your iPhone and Android! Please provide this summary of care documentation to your next provider. Your primary care clinician is listed as Alexy Blanton. If you have any questions after today's visit, please call [phone].

## 2017-06-14 NOTE — PROGRESS NOTES
HISTORY OF PRESENT ILLNESS  Becka Whiteside is a [de-identified] y.o. female    HPI: Ms. Leandra Coyle  returns today for f/u of chronic low back with multilevel lumbar degenerative changes. Also has c/o leg and right knee pain. She is S/P right TKA on 12/9/2016 by Dr. [place] Ham.  Left TKA in November of 2014. Ms. Leandra Coyle underwent right hip surgery on 4/17/2017 by Dr. Jason Ortiz. She reports significant improvement with her surgery. She was treated with pain medication while in the hospital but did not receive any prescriptions during discharge. She is currently in physical therapy and doing well so far. She is very happy with her progress. She still uses her walker to help protect her new hip but is able to ambulate much better than prior to her surgery. We will continue with her current treatment plan for now with no changes. I will have her follow-up in 2 months for reassessment. Current medication management consists of Percocet 7.5/325 TID PRN  Medications are helping with pain control and quality of life. Her pain is 6-7/10 with medication and 8/10 without. Pt describes pain as aching and throbbing. Aggravating factors include standing, bending, and walking. Relieved with rest, medication, and avoiding painful activities. Current treatment is helping to improve general activity, mood, walking, sleep, enjoyment of life    In the past 30 days, the patient reports approximately 20-30% pain relief with current treatment/medications. She  is otherwise doing well with no other complaints today. She denies any adverse events including nausea, vomiting, dizziness, increased constipation, hallucinations, or seizures. POC UDS today. Confirmation pending.          Allergies   Allergen Reactions    Forteo [Teriparatide] Other (comments) and Nausea and Vomiting     abd pains  abd pains    Lipitor [Atorvastatin] Other (comments) and Nausea and Vomiting     myalgias  myalgias    Livalo [Pitavastatin] Other (comments) and Nausea and Vomiting     myalgias  myalgias    Seafood Hives    Sulfa (Sulfonamide Antibiotics) Other (comments) and Hives     hives    Sulfur Other (comments)     rash    Zocor [Simvastatin] Other (comments) and Nausea and Vomiting     myalgias  myalgias       Past Surgical History:   Procedure Laterality Date    BREAST SURGERY PROCEDURE UNLISTED Bilateral     x2  breast biopsy, benign fibroadenoma    HX BACK SURGERY      HX COLONOSCOPY      2009  repeat 2019    HX GYN      HX KNEE ARTHROSCOPY      Right knee surgery    HX KNEE REPLACEMENT      2014 left    HX KNEE REPLACEMENT Right 12/2016    HX LUMBAR LAMINECTOMY      x2    HX PARTIAL HYSTERECTOMY      partial    HX TOTAL ABDOMINAL HYSTERECTOMY           Review of Systems   Constitutional: Negative for chills, fever and weight loss. HENT: Negative for congestion and sore throat. Eyes: Negative for blurred vision and double vision. Respiratory: Negative for cough, shortness of breath and wheezing. Cardiovascular: Negative for chest pain and palpitations. Gastrointestinal: Negative for constipation, diarrhea, heartburn, nausea and vomiting. Genitourinary: Negative. Neurological: Negative for dizziness, seizures and loss of consciousness. Endo/Heme/Allergies: Does not bruise/bleed easily. Psychiatric/Behavioral: Negative for depression. The patient is not nervous/anxious and does not have insomnia. Physical Exam   Constitutional: She is oriented to person, place, and time and well-developed, well-nourished, and in no distress. No distress. HENT:   Head: Normocephalic and atraumatic. Eyes: EOM are normal.   Pulmonary/Chest: Effort normal.   Musculoskeletal:        Right knee: Tenderness found. Lumbar back: She exhibits decreased range of motion, tenderness and pain. Neurological: She is alert and oriented to person, place, and time.  Gait ( ambulating with a cane) abnormal. Skin: Skin is warm and dry. No rash noted. She is not diaphoretic. No erythema. Psychiatric: Mood, memory, affect and judgment normal.   Nursing note and vitals reviewed. ASSESSMENT:    1. Encounter for long-term (current) use of high-risk medication    2. DDD (degenerative disc disease), lumbar    3. Lumbar foraminal stenosis    4. Primary osteoarthritis involving multiple joints    5. Primary osteoarthritis of right knee    6. Right hip pain    7. Chronic midline low back pain with bilateral sciatica    8. Osteoarthritis of right hip, unspecified osteoarthritis type         [place] Prescription Monitoring Program was reviewed which does not demonstrate aberrancies and/or inconsistencies with regard to the historical prescribing of controlled medications to this patient by other providers. PLAN / Pt Instructions:  1. Continue current plan with no evidence of addiction or diversion. Stable on current medication without adverse events. 2. Refill oxycodone 7.5/325 mg 3 times daily as needed. 3. Continue PT for right hip  4. Continue to follow-up with orthopedic surgeon regarding right hip   5. Discussed risks of addiction, dependency, and opioid induced hyperalgesia. 6. Return to clinic in 2 months    Medications Ordered Today   Medications    oxyCODONE-acetaminophen (PERCOCET 7.5) 7.5-325 mg per tablet     Sig: Take 1 Tab by mouth three (3) times daily as needed for Pain for up to 30 days. Max Daily Amount: 3 Tabs. Dispense:  90 Tab     Refill:  0    oxyCODONE-acetaminophen (PERCOCET 7.5) 7.5-325 mg per tablet     Sig: Take 1 Tab by mouth three (3) times daily as needed for Pain for up to 30 days. Max Daily Amount: 3 Tabs. Dispense:  90 Tab     Refill:  0       Pain medications prescribed with the objective of pain relief and improved physical and psychosocial function in this patient.     Spent 25 minutes with patient today reviewing the treatment plan, goals of treatment plan, and limitations of the treatment plan, to include the potential for side effects from medications and procedures. Elsa Arias 6/14/2017      Note: Please excuse any typographical errors. Voice recognition software was used for this note and may cause mistakes.

## 2017-06-28 ENCOUNTER — TELEPHONE (OUTPATIENT)
Dept: CARDIOLOGY CLINIC | Age: 82
End: 2017-06-28

## 2017-07-03 ENCOUNTER — TELEPHONE (OUTPATIENT)
Dept: CARDIOLOGY CLINIC | Age: 82
End: 2017-07-03

## 2017-08-09 ENCOUNTER — OFFICE VISIT (OUTPATIENT)
Dept: PAIN MANAGEMENT | Age: 82
End: 2017-08-09

## 2017-08-09 VITALS
SYSTOLIC BLOOD PRESSURE: 135 MMHG | RESPIRATION RATE: 16 BRPM | HEART RATE: 66 BPM | WEIGHT: 150 LBS | DIASTOLIC BLOOD PRESSURE: 69 MMHG | OXYGEN SATURATION: 100 % | TEMPERATURE: 97.6 F | BODY MASS INDEX: 28.34 KG/M2

## 2017-08-09 DIAGNOSIS — M54.42 CHRONIC MIDLINE LOW BACK PAIN WITH BILATERAL SCIATICA: Primary | ICD-10-CM

## 2017-08-09 DIAGNOSIS — G89.29 CHRONIC MIDLINE LOW BACK PAIN WITH BILATERAL SCIATICA: Primary | ICD-10-CM

## 2017-08-09 DIAGNOSIS — M54.41 CHRONIC MIDLINE LOW BACK PAIN WITH BILATERAL SCIATICA: Primary | ICD-10-CM

## 2017-08-09 DIAGNOSIS — M47.816 LUMBAR FACET ARTHROPATHY: ICD-10-CM

## 2017-08-09 DIAGNOSIS — M17.11 PRIMARY OSTEOARTHRITIS OF RIGHT KNEE: ICD-10-CM

## 2017-08-09 DIAGNOSIS — M51.36 DDD (DEGENERATIVE DISC DISEASE), LUMBAR: ICD-10-CM

## 2017-08-09 DIAGNOSIS — M48.061 LUMBAR FORAMINAL STENOSIS: ICD-10-CM

## 2017-08-09 DIAGNOSIS — M16.11 OSTEOARTHRITIS OF RIGHT HIP, UNSPECIFIED OSTEOARTHRITIS TYPE: ICD-10-CM

## 2017-08-09 DIAGNOSIS — M25.561 RIGHT KNEE PAIN, UNSPECIFIED CHRONICITY: ICD-10-CM

## 2017-08-09 DIAGNOSIS — M15.9 PRIMARY OSTEOARTHRITIS INVOLVING MULTIPLE JOINTS: ICD-10-CM

## 2017-08-09 DIAGNOSIS — G89.4 CHRONIC PAIN DISORDER: ICD-10-CM

## 2017-08-09 RX ORDER — OXYCODONE AND ACETAMINOPHEN 7.5; 325 MG/1; MG/1
1 TABLET ORAL
Qty: 90 TAB | Refills: 0 | Status: SHIPPED | OUTPATIENT
Start: 2017-09-13 | End: 2017-10-05 | Stop reason: SDUPTHER

## 2017-08-09 RX ORDER — OXYCODONE AND ACETAMINOPHEN 7.5; 325 MG/1; MG/1
1 TABLET ORAL
Qty: 90 TAB | Refills: 0 | Status: SHIPPED | OUTPATIENT
Start: 2017-08-14 | End: 2017-10-05 | Stop reason: SDUPTHER

## 2017-08-09 NOTE — PROGRESS NOTES
HISTORY OF PRESENT ILLNESS  Meaghan Reyes is a [de-identified] y.o. female    HPI: Ms. Giana Villatoro  returns today for f/u of chronic low back with multilevel lumbar degenerative changes. Also has c/o leg and right knee pain. 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 Suraj Haney. Left TKA in November of 2014 by Dr. Alicia Lane. Ms. Giana Villatoro reports some improvement since her last visit. She underwent right JUDITH on 4/17/2017 by Dr. Diana cameron. She is currently finished with physical therapy and doing well. She is no longer using her walker and only uses her cane when she leaves the house. She is happy with her progress so far. She has still been using her Percocet but not as often. I have advised her that she may use half to 1 tablet on an as-needed basis. We will see how she does before next visit. Hopefully we will be able to taper down on her Percocet. I will have her follow-up in 2 months for reassessment. Current medication management consists of Percocet 7.5/325 TID PRN  Medications are helping with pain control and quality of life. Her pain is 6-7/10 with medication and 8/10 without. Pt describes pain as aching and throbbing. Aggravating factors include standing, bending, and walking. Relieved with rest, medication, and avoiding painful activities. Current treatment is helping to improve general activity, mood, walking, sleep, enjoyment of life    In the past 30 days, the patient reports approximately 20-30% pain relief with current treatment/medications. She  is otherwise doing well with no other complaints today. She denies any adverse events including nausea, vomiting, dizziness, increased constipation, hallucinations, or seizures.           Allergies   Allergen Reactions    Forteo [Teriparatide] Other (comments) and Nausea and Vomiting     abd pains  abd pains    Lipitor [Atorvastatin] Other (comments) and Nausea and Vomiting     myalgias  myalgias  Livalo [Pitavastatin] Other (comments) and Nausea and Vomiting     myalgias  myalgias    Seafood Hives    Sulfa (Sulfonamide Antibiotics) Other (comments) and Hives     hives    Sulfur Other (comments)     rash    Zocor [Simvastatin] Other (comments) and Nausea and Vomiting     myalgias  myalgias       Past Surgical History:   Procedure Laterality Date    BREAST SURGERY PROCEDURE UNLISTED Bilateral     x2  breast biopsy, benign fibroadenoma    HX BACK SURGERY      HX COLONOSCOPY      2009  repeat 2019    HX GYN      HX KNEE ARTHROSCOPY      Right knee surgery    HX KNEE REPLACEMENT      2014 left    HX KNEE REPLACEMENT Right 12/2016    HX LUMBAR LAMINECTOMY      x2    HX PARTIAL HYSTERECTOMY      partial    HX TOTAL ABDOMINAL HYSTERECTOMY           Review of Systems   Constitutional: Negative for chills, fever and weight loss. HENT: Negative for congestion and sore throat. Eyes: Negative for blurred vision and double vision. Respiratory: Negative for cough, shortness of breath and wheezing. Cardiovascular: Negative for chest pain and palpitations. Gastrointestinal: Negative for constipation, diarrhea, heartburn, nausea and vomiting. Genitourinary: Negative. Neurological: Negative for dizziness, seizures and loss of consciousness. Endo/Heme/Allergies: Does not bruise/bleed easily. Psychiatric/Behavioral: Negative for depression. The patient is not nervous/anxious and does not have insomnia. Physical Exam   Constitutional: She is oriented to person, place, and time and well-developed, well-nourished, and in no distress. No distress. HENT:   Head: Normocephalic and atraumatic. Eyes: EOM are normal.   Pulmonary/Chest: Effort normal.   Musculoskeletal:        Right knee: Tenderness found. Lumbar back: She exhibits decreased range of motion, tenderness and pain. Neurological: She is alert and oriented to person, place, and time.  Gait ( ambulating with a cane) abnormal.   Skin: Skin is warm and dry. No rash noted. She is not diaphoretic. No erythema. Psychiatric: Mood, memory, affect and judgment normal.   Nursing note and vitals reviewed. ASSESSMENT:    1. Chronic midline low back pain with bilateral sciatica    2. DDD (degenerative disc disease), lumbar    3. Chronic pain disorder    4. Primary osteoarthritis involving multiple joints    5. Lumbar facet arthropathy    6. Lumbar foraminal stenosis    7. Primary osteoarthritis of right knee    8. Osteoarthritis of right hip, unspecified osteoarthritis type    9. Right knee pain, unspecified chronicity         [place] Prescription Monitoring Program was reviewed which does not demonstrate aberrancies and/or inconsistencies with regard to the historical prescribing of controlled medications to this patient by other providers. PLAN / Pt Instructions:  1. Continue current plan with no evidence of addiction or diversion. Stable on current medication without adverse events. 2. Refill oxycodone 7.5/325 mg 3 times daily as needed. 3. Continue PT for right hip  4. Continue to follow-up with orthopedic surgeon regarding right hip   5. Discussed risks of addiction, dependency, and opioid induced hyperalgesia. 6. Return to clinic in 2 months    Medications Ordered Today   Medications    oxyCODONE-acetaminophen (PERCOCET 7.5) 7.5-325 mg per tablet     Sig: Take 1 Tab by mouth three (3) times daily as needed for Pain for up to 30 days. Max Daily Amount: 3 Tabs. Dispense:  90 Tab     Refill:  0    oxyCODONE-acetaminophen (PERCOCET 7.5) 7.5-325 mg per tablet     Sig: Take 1 Tab by mouth three (3) times daily as needed for Pain for up to 30 days. Max Daily Amount: 3 Tabs. Dispense:  90 Tab     Refill:  0       Pain medications prescribed with the objective of pain relief and improved physical and psychosocial function in this patient.     Spent 25 minutes with patient today reviewing the treatment plan, goals of treatment plan, and limitations of the treatment plan, to include the potential for side effects from medications and procedures. Delmy Reid, [address] 8/9/2017      Note: Please excuse any typographical errors. Voice recognition software was used for this note and may cause mistakes.

## 2017-08-09 NOTE — MR AVS SNAPSHOT
Visit Information Date & Time Provider Department Dept. Phone Encounter #  
 8/9/2017 10:00 AM Alonzo Blackwell, [address] [place] [address] for Pain Management  Follow-up Instructions Return in about 2 months (around 10/9/2017). Follow-up and Disposition History Upcoming Health Maintenance Date Due DTaP/Tdap/Td series (1 - Tdap) 4/3/1958 Pneumococcal 65+ Low/Medium Risk (2 of 2 - PPSV23) 11/1/2012 GLAUCOMA SCREENING Q2Y 6/10/2016 MEDICARE YEARLY EXAM 10/29/2016 INFLUENZA AGE 9 TO ADULT 8/1/2017 COLONOSCOPY 6/1/2019 Allergies as of 8/9/2017  Review Complete On: 8/9/2017 By: JULIETTE Johnson Severity Noted Reaction Type Reactions Forteo [Teriparatide]  09/13/2016    Other (comments), Nausea and Vomiting  
 abd pains 
abd pains Lipitor [Atorvastatin]  02/03/2017    Other (comments), Nausea and Vomiting  
 myalgias 
myalgias Livalo [Pitavastatin]  02/03/2017    Other (comments), Nausea and Vomiting  
 myalgias 
myalgias Seafood  07/03/2011   Systemic Hives Sulfa (Sulfonamide Antibiotics)  10/11/2007    Other (comments), Hives  
 hives Sulfur    Other (comments)  
 rash Zocor [Simvastatin]  02/03/2017    Other (comments), Nausea and Vomiting  
 myalgias 
myalgias Current Immunizations  Reviewed on 8/25/2014 Name Date Pneumococcal Vaccine (Unspecified Type) 11/1/2007 Not reviewed this visit You Were Diagnosed With   
  
 Codes Comments Chronic midline low back pain with bilateral sciatica    -  Primary ICD-10-CM: M54.41, M54.42, G89.29 ICD-9-CM: 724.2, 724.3, 338.29   
 DDD (degenerative disc disease), lumbar     ICD-10-CM: M51.36 
ICD-9-CM: 722.52 Chronic pain disorder     ICD-10-CM: G89.4 ICD-9-CM: 338.4 Primary osteoarthritis involving multiple joints     ICD-10-CM: M15.0 ICD-9-CM: 715.09 Lumbar facet arthropathy     ICD-10-CM: M12.88 ICD-9-CM: 721.3 Lumbar foraminal stenosis     ICD-10-CM: M99.83 ICD-9-CM: 724.02   
 Primary osteoarthritis of right knee     ICD-10-CM: M17.11 ICD-9-CM: 715.16 Osteoarthritis of right hip, unspecified osteoarthritis type     ICD-10-CM: M16.11 
ICD-9-CM: 715.95 Right knee pain, unspecified chronicity     ICD-10-CM: M25.561 ICD-9-CM: 719.46 Vitals BP Pulse Temp Resp Weight(growth percentile) SpO2  
 135/69 66 97.6 °F (36.4 °C) 16 150 lb (68 kg) 100% BMI OB Status Smoking Status 28.34 kg/m2 Hysterectomy Former Smoker BMI and BSA Data Body Mass Index Body Surface Area  
 28.34 kg/m 2 1.71 m 2 Preferred Pharmacy Pharmacy Name Phone [address], [address] [place] [address]  [phone] Your Updated Medication List  
  
   
This list is accurate as of: 8/9/17 10:55 AM.  Always use your most recent med list.  
  
  
  
  
 aspirin delayed-release 81 mg tablet Take 1 Tab by mouth daily. Start 5/1/2017  
  
 calcium-vitamin D 500 mg(1,250mg) -200 unit per tablet Commonly known as:  OYSTER SHELL Take 1 Tab by mouth two (2) times daily (with meals). chlorthalidone 25 mg tablet Commonly known as:  Verlena Anne-Marie Take 1 Tab by mouth daily. cholecalciferol 1,000 unit Cap Commonly known as:  VITAMIN D3 Take 4 Caps by mouth daily. ferrous sulfate 325 mg (65 mg iron) tablet Take 1 Tab by mouth two (2) times daily (with meals). gabapentin 300 mg capsule Commonly known as:  NEURONTIN Take 1 Cap by mouth nightly.  
  
 magnesium citrate solution Take 148 mL by mouth daily as needed for Other (Constipation). omeprazole 20 mg capsule Commonly known as:  PRILOSEC Take 20 mg by mouth daily. * oxyCODONE-acetaminophen 7.5-325 mg per tablet Commonly known as:  PERCOCET 7.5 Take 1 Tab by mouth three (3) times daily as needed for Pain for up to 30 days. Max Daily Amount: 3 Tabs. Start taking on:  8/14/2017 * oxyCODONE-acetaminophen 7.5-325 mg per tablet Commonly known as:  PERCOCET 7.5 Take 1 Tab by mouth three (3) times daily as needed for Pain for up to 30 days. Max Daily Amount: 3 Tabs. Start taking on:  9/13/2017 REFRESH LIQUIGEL 1 % Dlgl Generic drug:  carboxymethylcellulose sodium Apply 1 Drop to eye as needed. Indications: DRY EYE  
  
 * Notice: This list has 2 medication(s) that are the same as other medications prescribed for you. Read the directions carefully, and ask your doctor or other care provider to review them with you. Prescriptions Printed Refills  
 oxyCODONE-acetaminophen (PERCOCET 7.5) 7.5-325 mg per tablet 0 Starting on: 8/14/2017 Sig: Take 1 Tab by mouth three (3) times daily as needed for Pain for up to 30 days. Max Daily Amount: 3 Tabs. Class: Print Route: Oral  
 oxyCODONE-acetaminophen (PERCOCET 7.5) 7.5-325 mg per tablet 0 Starting on: 9/13/2017 Sig: Take 1 Tab by mouth three (3) times daily as needed for Pain for up to 30 days. Max Daily Amount: 3 Tabs. Class: Print Route: Oral  
  
Follow-up Instructions Return in about 2 months (around 10/9/2017). Patient Instructions 1. Continue current plan with no evidence of addiction or diversion. Stable on current medication without adverse events. 2. Refill oxycodone 7.5/325 mg 3 times daily as needed. 3. Continue PT for right hip 4. Continue to follow-up with orthopedic surgeon regarding right hip 5. Discussed risks of addiction, dependency, and opioid induced hyperalgesia. 6. Return to clinic in 2 months Introducing [facility]! Dear Rich Beltran: Thank you for requesting a [employer] account. Our records indicate that you already have an active [employer] account. You can access your account anytime at [email or web address] Did you know that you can access your hospital and ER discharge instructions at any time in Zerto? You can also review all of your test results from your hospital stay or ER visit. Additional Information If you have questions, please visit the Frequently Asked Questions section of the Zerto website at [email or web address] Remember, Zerto is NOT to be used for urgent needs. For medical emergencies, dial 911. Now available from your iPhone and Android! Please provide this summary of care documentation to your next provider. Your primary care clinician is listed as [facility]. If you have any questions after today's visit, please call [phone].

## 2017-08-09 NOTE — PATIENT INSTRUCTIONS
1. Continue current plan with no evidence of addiction or diversion. Stable on current medication without adverse events. 2. Refill oxycodone 7.5/325 mg 3 times daily as needed. 3. Continue PT for right hip  4. Continue to follow-up with orthopedic surgeon regarding right hip   5. Discussed risks of addiction, dependency, and opioid induced hyperalgesia.    6. Return to clinic in 2 months

## 2017-08-09 NOTE — PROGRESS NOTES
Nursing Notes    Patient presents to the office today in follow-up. Patient rates her pain at 1/10 on the numerical pain scale. Reviewed medications with counts as follows:    Rx Date filled Qty Dispensed Pill Count Last Dose Short   Percocet 7.5 mg 07/15/17 90 27 Last bnight no         Comments:  Patient states she had high bp she called her PCP and went in to see her and PCP increased her bp  meds from 1 to 2   Optometry did a PVL on Patient she is waiting on results. Patient states she was dizzy and had increased bp     POC UDS was not performed in office today    Any new labs or imaging since last appointment? NO    Have you been to an emergency room (ER) or urgent care clinic since your last visit? NO            Have you been hospitalized since your last visit? NO     If yes, where, when, and reason for visit? Have you seen or consulted any other health care providers outside of the [address] [place] [address]  since your last visit? NO     If yes, where, when, and reason for visit? Ms. Megha Eason has a reminder for a \"due or due soon\" health maintenance. I have asked that she contact her primary care provider for follow-up on this health maintenance.

## 2017-10-05 ENCOUNTER — OFFICE VISIT (OUTPATIENT)
Dept: PAIN MANAGEMENT | Age: 82
End: 2017-10-05

## 2017-10-05 VITALS
HEART RATE: 64 BPM | SYSTOLIC BLOOD PRESSURE: 147 MMHG | RESPIRATION RATE: 18 BRPM | DIASTOLIC BLOOD PRESSURE: 67 MMHG | BODY MASS INDEX: 28.32 KG/M2 | TEMPERATURE: 97.4 F | WEIGHT: 150 LBS | HEIGHT: 61 IN

## 2017-10-05 DIAGNOSIS — M54.41 CHRONIC MIDLINE LOW BACK PAIN WITH BILATERAL SCIATICA: ICD-10-CM

## 2017-10-05 DIAGNOSIS — M48.061 LUMBAR FORAMINAL STENOSIS: ICD-10-CM

## 2017-10-05 DIAGNOSIS — M17.11 PRIMARY OSTEOARTHRITIS OF RIGHT KNEE: ICD-10-CM

## 2017-10-05 DIAGNOSIS — G89.29 CHRONIC MIDLINE LOW BACK PAIN WITH BILATERAL SCIATICA: ICD-10-CM

## 2017-10-05 DIAGNOSIS — M54.42 CHRONIC MIDLINE LOW BACK PAIN WITH BILATERAL SCIATICA: ICD-10-CM

## 2017-10-05 DIAGNOSIS — M51.26 LUMBAR DISC HERNIATION: ICD-10-CM

## 2017-10-05 DIAGNOSIS — M51.36 DDD (DEGENERATIVE DISC DISEASE), LUMBAR: ICD-10-CM

## 2017-10-05 DIAGNOSIS — M47.816 LUMBAR FACET ARTHROPATHY: ICD-10-CM

## 2017-10-05 DIAGNOSIS — M15.9 PRIMARY OSTEOARTHRITIS INVOLVING MULTIPLE JOINTS: ICD-10-CM

## 2017-10-05 DIAGNOSIS — M25.561 RIGHT KNEE PAIN, UNSPECIFIED CHRONICITY: ICD-10-CM

## 2017-10-05 DIAGNOSIS — G89.4 CHRONIC PAIN DISORDER: ICD-10-CM

## 2017-10-05 RX ORDER — OXYCODONE AND ACETAMINOPHEN 7.5; 325 MG/1; MG/1
1 TABLET ORAL
Qty: 90 TAB | Refills: 0 | Status: SHIPPED | OUTPATIENT
Start: 2017-12-19 | End: 2017-10-19 | Stop reason: ALTCHOICE

## 2017-10-05 RX ORDER — OXYCODONE AND ACETAMINOPHEN 7.5; 325 MG/1; MG/1
1 TABLET ORAL
Qty: 90 TAB | Refills: 0 | Status: SHIPPED | OUTPATIENT
Start: 2017-11-20 | End: 2017-10-19 | Stop reason: ALTCHOICE

## 2017-10-05 RX ORDER — ATORVASTATIN CALCIUM 10 MG/1
10 TABLET, FILM COATED ORAL DAILY
COMMUNITY
End: 2018-04-18

## 2017-10-05 RX ORDER — OXYCODONE AND ACETAMINOPHEN 7.5; 325 MG/1; MG/1
1 TABLET ORAL
Qty: 90 TAB | Refills: 0 | Status: SHIPPED | OUTPATIENT
Start: 2017-10-21 | End: 2017-11-20

## 2017-10-05 NOTE — PATIENT INSTRUCTIONS
1. Continue current plan with no evidence of addiction or diversion. Stable on current medication without adverse events. 2. Refill oxycodone 7.5/325 mg 3 times daily as needed. 3. Continue home therapy for right hip  4. Continue to follow-up with orthopedic surgeon regarding right hip   5. Discussed risks of addiction, dependency, and opioid induced hyperalgesia.    6. Return to clinic in 3 months

## 2017-10-05 NOTE — PROGRESS NOTES
Nursing Notes    Patient presents to the office today in follow-up. Patient rates her pain at 2/10 on the numerical pain scale. Reviewed medications with counts as follows:    Rx Date filled Qty Dispensed Pill Count Last Dose Short   Percocet 7.5/325 mg  09/22/17 90 64 today no                                  POC UDS was not performed in office today    Any new labs or imaging since last appointment? YES. Pt states that she has some labs done. She also had an MRI and MRA done of her head. This was ordered by her neurologist    Have you been to an emergency room (ER) or urgent care clinic since your last visit? NO            Have you been hospitalized since your last visit? NO     If yes, where, when, and reason for visit? Have you seen or consulted any other health care providers outside of the [address] [place] [address]  since your last visit? YES     If yes, where, when, and reason for visit? neurology    HM deferred to pcp.

## 2017-10-05 NOTE — ACP (ADVANCE CARE PLANNING)
The pt has an advanced medical directive on file. She states that it is still current and there have not been any changes.

## 2017-10-05 NOTE — MR AVS SNAPSHOT
Visit Information Date & Time Provider Department Dept. Phone Encounter #  
 10/5/2017 10:00 AM Dilan Guthrie, [address] [place] [address] for Pain Management [id number] Follow-up Instructions Return in about 3 months (around 1/5/2018). Your Appointments 10/19/2017  1:30 PM  
Follow Up with Estevan Easton MD  
[address] [facility]) Appt Note: Follow up/ pt called in/ Medicare and BCBS PPO  
 [address] [facility] [address] [place]  
  
   
 [address] [facility] [address] [facility] [address] Upcoming Health Maintenance Date Due DTaP/Tdap/Td series (1 - Tdap) 4/3/1958 Pneumococcal 65+ Low/Medium Risk (2 of 2 - PPSV23) 11/1/2012 GLAUCOMA SCREENING Q2Y 6/10/2016 MEDICARE YEARLY EXAM 10/29/2016 INFLUENZA AGE 9 TO ADULT 8/1/2017 COLONOSCOPY 6/1/2019 Allergies as of 10/5/2017  Review Complete On: 10/5/2017 By: JULIETTE Couch Severity Noted Reaction Type Reactions Forteo [Teriparatide]  09/13/2016    Other (comments), Nausea and Vomiting  
 abd pains 
abd pains Lipitor [Atorvastatin]  02/03/2017    Other (comments), Nausea and Vomiting  
 myalgias 
myalgias Livalo [Pitavastatin]  02/03/2017    Other (comments), Nausea and Vomiting  
 myalgias 
myalgias Seafood  07/03/2011   Systemic Hives Sulfa (Sulfonamide Antibiotics)  10/11/2007    Other (comments), Hives  
 hives Sulfur    Other (comments)  
 rash Zocor [Simvastatin]  02/03/2017    Other (comments), Nausea and Vomiting  
 myalgias 
myalgias Current Immunizations  Reviewed on 8/25/2014 Name Date Pneumococcal Vaccine (Unspecified Type) 11/1/2007 Not reviewed this visit You Were Diagnosed With   
  
 Codes Comments Chronic midline low back pain with bilateral sciatica     ICD-10-CM: M54.41, M54.42, G89.29 ICD-9-CM: 724.2, 724.3, 338.29   
 Right knee pain, unspecified chronicity     ICD-10-CM: M25.561 ICD-9-CM: 719.46 Osteoarthrosis involving lower leg     ICD-10-CM: M17.10 ICD-9-CM: 715.96 Chronic pain disorder     ICD-10-CM: G89.4 ICD-9-CM: 338.4 DDD (degenerative disc disease), lumbar     ICD-10-CM: M51.36 
ICD-9-CM: 722.52 Lumbar disc herniation     ICD-10-CM: M51.26 
ICD-9-CM: 722.10 Lumbar facet arthropathy     ICD-10-CM: M12.88 ICD-9-CM: 721.3 Lumbar foraminal stenosis     ICD-10-CM: M99.83 ICD-9-CM: 724.02   
 Primary osteoarthritis involving multiple joints     ICD-10-CM: M15.0 ICD-9-CM: 715.09 Primary osteoarthritis of right knee     ICD-10-CM: M17.11 ICD-9-CM: 715.16 Vitals BP Pulse Temp Resp Height(growth percentile) Weight(growth percentile) 147/67 64 97.4 °F (36.3 °C) 18 5' 1\" (1.549 m) 150 lb (68 kg) BMI OB Status Smoking Status 28.34 kg/m2 Hysterectomy Former Smoker Vitals History BMI and BSA Data Body Mass Index Body Surface Area  
 28.34 kg/m 2 1.71 m 2 Preferred Pharmacy Pharmacy Name Phone [facility], [address] [place] [address] [phone] Your Updated Medication List  
  
   
This list is accurate as of: 10/5/17 10:34 AM.  Always use your most recent med list.  
  
  
  
  
 aspirin delayed-release 81 mg tablet Take 1 Tab by mouth daily. Start 5/1/2017  
  
 calcium-vitamin D 500 mg(1,250mg) -200 unit per tablet Commonly known as:  OYSTER SHELL Take 1 Tab by mouth two (2) times daily (with meals). chlorthalidone 25 mg tablet Commonly known as:  Morgan Dace Take 1 Tab by mouth daily. cholecalciferol 1,000 unit Cap Commonly known as:  VITAMIN D3 Take 4 Caps by mouth daily. ferrous sulfate 325 mg (65 mg iron) tablet Take 1 Tab by mouth two (2) times daily (with meals). gabapentin 300 mg capsule Commonly known as:  NEURONTIN  
 Take 1 Cap by mouth nightly. LIPITOR 10 mg tablet Generic drug:  atorvastatin Take 10 mg by mouth daily. magnesium citrate solution Take 148 mL by mouth daily as needed for Other (Constipation). omeprazole 20 mg capsule Commonly known as:  PRILOSEC Take 20 mg by mouth daily. * oxyCODONE-acetaminophen 7.5-325 mg per tablet Commonly known as:  PERCOCET 7.5 Take 1 Tab by mouth three (3) times daily as needed for Pain for up to 30 days. Max Daily Amount: 3 Tabs. Start taking on:  10/21/2017 * oxyCODONE-acetaminophen 7.5-325 mg per tablet Commonly known as:  PERCOCET 7.5 Take 1 Tab by mouth three (3) times daily as needed for Pain for up to 30 days. Max Daily Amount: 3 Tabs. Start taking on:  11/20/2017 * oxyCODONE-acetaminophen 7.5-325 mg per tablet Commonly known as:  PERCOCET 7.5 Take 1 Tab by mouth three (3) times daily as needed for Pain for up to 30 days. Max Daily Amount: 3 Tabs. Start taking on:  12/19/2017 REFRESH LIQUIGEL 1 % Dlgl Generic drug:  carboxymethylcellulose sodium Apply 1 Drop to eye as needed. Indications: DRY EYE  
  
 * Notice: This list has 3 medication(s) that are the same as other medications prescribed for you. Read the directions carefully, and ask your doctor or other care provider to review them with you. Prescriptions Printed Refills  
 oxyCODONE-acetaminophen (PERCOCET 7.5) 7.5-325 mg per tablet 0 Starting on: 10/21/2017 Sig: Take 1 Tab by mouth three (3) times daily as needed for Pain for up to 30 days. Max Daily Amount: 3 Tabs. Class: Print Route: Oral  
 oxyCODONE-acetaminophen (PERCOCET 7.5) 7.5-325 mg per tablet 0 Starting on: 11/20/2017 Sig: Take 1 Tab by mouth three (3) times daily as needed for Pain for up to 30 days. Max Daily Amount: 3 Tabs. Class: Print Route: Oral  
 oxyCODONE-acetaminophen (PERCOCET 7.5) 7.5-325 mg per tablet 0 Starting on: 12/19/2017 Sig: Take 1 Tab by mouth three (3) times daily as needed for Pain for up to 30 days. Max Daily Amount: 3 Tabs. Class: Print Route: Oral  
  
Follow-up Instructions Return in about 3 months (around 1/5/2018). Patient Instructions 1. Continue current plan with no evidence of addiction or diversion. Stable on current medication without adverse events. 2. Refill oxycodone 7.5/325 mg 3 times daily as needed. 3. Continue home therapy for right hip 4. Continue to follow-up with orthopedic surgeon regarding right hip 5. Discussed risks of addiction, dependency, and opioid induced hyperalgesia. 6. Return to clinic in 3 months Introducing [facility]! Dear Axel Hadley: Thank you for requesting a [employer] account. Our records indicate that you already have an active OrangeSoda account. You can access your account anytime at [email or web address] Did you know that you can access your hospital and ER discharge instructions at any time in OrangeSoda? You can also review all of your test results from your hospital stay or ER visit. Additional Information If you have questions, please visit the Frequently Asked Questions section of the OrangeSoda website at [email or web address] Remember, OrangeSoda is NOT to be used for urgent needs. For medical emergencies, dial 911. Now available from your iPhone and Android! Please provide this summary of care documentation to your next provider. Your primary care clinician is listed as Sweetie Browning. If you have any questions after today's visit, please call [phone].

## 2017-10-05 NOTE — PROGRESS NOTES
HISTORY OF PRESENT ILLNESS  Dolan Cogan is a [de-identified] y.o. female    HPI: Ms. Osvaldo Gómez  returns today for f/u of chronic low back with multilevel lumbar degenerative changes. Also has c/o leg and right knee pain. 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 Basil Ervin. Left TKA in November of 2014 by Dr. Natalio Chandler. Ms. Osvaldo Gómez continues with improvement. She underwent right JUDITH on 4/17/2017 by Dr. Basil Ervin with good improvement in her right hip pain. She is now using only her cane. She has completed physical therapy but continues with her therapy at home. She is very happy with her progress. She does still complain of low back pain. She has been receiving lidocaine patches by her family care doctor. We have also discussed possibly using H wave for her as well. We will have to figure out scheduling with this. we will discuss this further next visit. We will continue with her current treatment plan no changes today. I will have her follow-up in 3 months for further evaluation and recommendation. Current medication management consists of Percocet 7.5/325 TID PRN  Medications are helping with pain control and quality of life. Her pain is 6-7/10 with medication and 8/10 without. Pt describes pain as aching and throbbing. Aggravating factors include standing, bending, and walking. Relieved with rest, medication, and avoiding painful activities. Current treatment is helping to improve general activity, mood, walking, sleep, enjoyment of life    In the past 30 days, the patient reports approximately 20-30% pain relief with current treatment/medications. She  is otherwise doing well with no other complaints today. She denies any adverse events including nausea, vomiting, dizziness, increased constipation, hallucinations, or seizures.           Allergies   Allergen Reactions    Forteo [Teriparatide] Other (comments) and Nausea and Vomiting     abd pains  abd pains    Lipitor [Atorvastatin] Other (comments) and Nausea and Vomiting     myalgias  myalgias    Livalo [Pitavastatin] Other (comments) and Nausea and Vomiting     myalgias  myalgias    Seafood Hives    Sulfa (Sulfonamide Antibiotics) Other (comments) and Hives     hives    Sulfur Other (comments)     rash    Zocor [Simvastatin] Other (comments) and Nausea and Vomiting     myalgias  myalgias       Past Surgical History:   Procedure Laterality Date    BREAST SURGERY PROCEDURE UNLISTED Bilateral     x2  breast biopsy, benign fibroadenoma    HX BACK SURGERY      HX COLONOSCOPY      2009  repeat 2019    HX GYN      HX KNEE ARTHROSCOPY      Right knee surgery    HX KNEE REPLACEMENT      2014 left    HX KNEE REPLACEMENT Right 12/2016    HX LUMBAR LAMINECTOMY      x2    HX PARTIAL HYSTERECTOMY      partial    HX TOTAL ABDOMINAL HYSTERECTOMY           Review of Systems   Constitutional: Negative for chills, fever and weight loss. HENT: Negative for congestion and sore throat. Eyes: Negative for blurred vision and double vision. Respiratory: Negative for cough, shortness of breath and wheezing. Cardiovascular: Negative for chest pain and palpitations. Gastrointestinal: Negative for constipation, diarrhea, heartburn, nausea and vomiting. Genitourinary: Negative. Neurological: Negative for dizziness, seizures and loss of consciousness. Endo/Heme/Allergies: Does not bruise/bleed easily. Psychiatric/Behavioral: Negative for depression. The patient is not nervous/anxious and does not have insomnia. Physical Exam   Constitutional: She is oriented to person, place, and time and well-developed, well-nourished, and in no distress. No distress. HENT:   Head: Normocephalic and atraumatic. Eyes: EOM are normal.   Pulmonary/Chest: Effort normal.   Musculoskeletal:        Right knee: Tenderness found.         Lumbar back: She exhibits decreased range of motion, tenderness and pain.   Neurological: She is alert and oriented to person, place, and time. Gait ( ambulating with a cane) abnormal.   Skin: Skin is warm and dry. No rash noted. She is not diaphoretic. No erythema. Psychiatric: Mood, memory, affect and judgment normal.   Nursing note and vitals reviewed. ASSESSMENT:    1. Chronic midline low back pain with bilateral sciatica    2. Right knee pain, unspecified chronicity    3. Osteoarthrosis involving lower leg    4. Chronic pain disorder    5. DDD (degenerative disc disease), lumbar    6. Lumbar disc herniation    7. Lumbar facet arthropathy    8. Lumbar foraminal stenosis    9. Primary osteoarthritis involving multiple joints    10. Primary osteoarthritis of right knee         [place] Prescription Monitoring Program was reviewed which does not demonstrate aberrancies and/or inconsistencies with regard to the historical prescribing of controlled medications to this patient by other providers. PLAN / Pt Instructions:  1. Continue current plan with no evidence of addiction or diversion. Stable on current medication without adverse events. 2. Refill oxycodone 7.5/325 mg 3 times daily as needed. 3. Continue home therapy for right hip  4. Continue to follow-up with orthopedic surgeon regarding right hip   5. Discussed risks of addiction, dependency, and opioid induced hyperalgesia. 6. Return to clinic in 3 months       Medications Ordered Today   Medications    oxyCODONE-acetaminophen (PERCOCET 7.5) 7.5-325 mg per tablet     Sig: Take 1 Tab by mouth three (3) times daily as needed for Pain for up to 30 days. Max Daily Amount: 3 Tabs. Dispense:  90 Tab     Refill:  0    oxyCODONE-acetaminophen (PERCOCET 7.5) 7.5-325 mg per tablet     Sig: Take 1 Tab by mouth three (3) times daily as needed for Pain for up to 30 days. Max Daily Amount: 3 Tabs.      Dispense:  90 Tab     Refill:  0    oxyCODONE-acetaminophen (PERCOCET 7.5) 7.5-325 mg per tablet     Sig: Take 1 Tab by mouth three (3) times daily as needed for Pain for up to 30 days. Max Daily Amount: 3 Tabs. Dispense:  90 Tab     Refill:  0       Pain medications prescribed with the objective of pain relief and improved physical and psychosocial function in this patient. Spent 25 minutes with patient today reviewing the treatment plan, goals of treatment plan, and limitations of the treatment plan, to include the potential for side effects from medications and procedures. Bouchra Irving, [address] 10/5/2017      Note: Please excuse any typographical errors. Voice recognition software was used for this note and may cause mistakes.

## 2017-10-19 ENCOUNTER — OFFICE VISIT (OUTPATIENT)
Dept: SURGERY | Age: 82
End: 2017-10-19

## 2017-10-19 VITALS
SYSTOLIC BLOOD PRESSURE: 136 MMHG | WEIGHT: 150 LBS | DIASTOLIC BLOOD PRESSURE: 64 MMHG | HEIGHT: 61 IN | BODY MASS INDEX: 28.32 KG/M2 | TEMPERATURE: 98 F | HEART RATE: 64 BPM

## 2017-10-19 DIAGNOSIS — Z98.890 H/O BREAST BIOPSY: Primary | ICD-10-CM

## 2017-10-19 NOTE — PROGRESS NOTES
Jsoe Preston is a [de-identified] y.o. female who presents today with   Chief Complaint   Patient presents with    Follow-up     Pt presents today for follow up Breast Check. Mammo 10/10/2017                 1. Have you been to the ER, urgent care clinic since your last visit? Hospitalized since your last visit? No    2. Have you seen or consulted any other health care providers outside of the [address] [place] [address] since your last visit? Include any pap smears or colon screening.  No

## 2017-10-19 NOTE — MR AVS SNAPSHOT
Visit Information Date & Time Provider Department Dept. Phone Encounter #  
 10/19/2017  1:30 PM Jeremy Tamayo MD [address] [phone] [id number] Your Appointments 1/2/2018  2:40 PM  
Follow Up with JULIETTE Galdamez [address] [place] [address] for Pain Management (FINESSE SCHEDULING) Appt Note: return in 3 months [address] [id number] [phone]  Amrit [phone] [id number] Upcoming Health Maintenance Date Due DTaP/Tdap/Td series (1 - Tdap) 4/3/1956 Pneumococcal 65+ Low/Medium Risk (2 of 2 - PPSV23) 11/1/2012 GLAUCOMA SCREENING Q2Y 6/10/2016 MEDICARE YEARLY EXAM 10/29/2016 INFLUENZA AGE 9 TO ADULT 8/1/2017 COLONOSCOPY 6/1/2019 Allergies as of 10/19/2017  Review Complete On: 10/5/2017 By: JULIETTE Galdamez Severity Noted Reaction Type Reactions Forteo [Teriparatide]  09/13/2016    Other (comments), Nausea and Vomiting  
 abd pains 
abd pains Lipitor [Atorvastatin]  02/03/2017    Other (comments), Nausea and Vomiting  
 myalgias 
myalgias Livalo [Pitavastatin]  02/03/2017    Other (comments), Nausea and Vomiting  
 myalgias 
myalgias Seafood  07/03/2011   Systemic Hives Sulfa (Sulfonamide Antibiotics)  10/11/2007    Other (comments), Hives  
 hives Sulfur    Other (comments)  
 rash Zocor [Simvastatin]  02/03/2017    Other (comments), Nausea and Vomiting  
 myalgias 
myalgias Current Immunizations  Reviewed on 8/25/2014 Name Date Pneumococcal Vaccine (Unspecified Type) 11/1/2007 Not reviewed this visit You Were Diagnosed With   
  
 Codes Comments H/O breast biopsy    -  Primary ICD-10-CM: H88.530 ICD-9-CM: V45.89 Vitals  BP Pulse Temp Height(growth percentile) Weight(growth percentile) BMI  
 136/64 (BP 1 Location: Left arm, BP Patient Position: At rest) 64 98 °F (36.7 °C) (Oral) 5' 1\" (1.549 m) 150 lb (68 kg) 28.34 kg/m2 OB Status Smoking Status Hysterectomy Former Smoker BMI and BSA Data Body Mass Index Body Surface Area  
 28.34 kg/m 2 1.71 m 2 Preferred Pharmacy Pharmacy Name Phone [phone], [facility] [place]  [phone] Your Updated Medication List  
  
   
This list is accurate as of: 10/19/17  3:14 PM.  Always use your most recent med list.  
  
  
  
  
 aspirin delayed-release 81 mg tablet Take 1 Tab by mouth daily. Start 5/1/2017  
  
 calcium-vitamin D 500 mg(1,250mg) -200 unit per tablet Commonly known as:  OYSTER SHELL Take 1 Tab by mouth two (2) times daily (with meals). chlorthalidone 25 mg tablet Commonly known as:  Debra Carry Take 1 Tab by mouth daily. cholecalciferol 1,000 unit Cap Commonly known as:  VITAMIN D3 Take 4 Caps by mouth daily. ferrous sulfate 325 mg (65 mg iron) tablet Take 1 Tab by mouth two (2) times daily (with meals). gabapentin 300 mg capsule Commonly known as:  NEURONTIN Take 1 Cap by mouth nightly. LIPITOR 10 mg tablet Generic drug:  atorvastatin Take 10 mg by mouth daily. omeprazole 20 mg capsule Commonly known as:  PRILOSEC Take 20 mg by mouth daily. oxyCODONE-acetaminophen 7.5-325 mg per tablet Commonly known as:  PERCOCET 7.5 Take 1 Tab by mouth three (3) times daily as needed for Pain for up to 30 days. Max Daily Amount: 3 Tabs. Start taking on:  10/21/2017 REFRESH LIQUIGEL 1 % Dlgl Generic drug:  carboxymethylcellulose sodium Apply 1 Drop to eye as needed. Indications: DRY EYE Introducing [facility]! Dear Popeye Necessary: Thank you for requesting a [facility] account. Our records indicate that you already have an active [facility] account.   You can access your account anytime at [email or web address] Did you know that you can access your hospital and ER discharge instructions at any time in [employer]? You can also review all of your test results from your hospital stay or ER visit. Additional Information If you have questions, please visit the Frequently Asked Questions section of the [employer] website at [email or web address] Remember, [employer] is NOT to be used for urgent needs. For medical emergencies, dial 911. Now available from your iPhone and Android! Please provide this summary of care documentation to your next provider. Your primary care clinician is listed as Chely Hill. If you have any questions after today's visit, please call [phone].

## 2017-10-19 NOTE — PROGRESS NOTES
Subjective:      Klaudia Schwab is a [de-identified] y.o. female presents for followup care of benign breast disease 1 years from their last visit. Overall,  has been well since last visit. She has been doing self breast exams. She has not noted any skin changes, has not noted any nipple discharge, has not noted any masses in breasts. Past Medical History:   Diagnosis Date    Allergic rhinitis     Chronic pain     Degenerative disk disease     Degenerative joint disease     Degenerative spine disease     Depression     nos    Dyslipidemia     intollerant of statins    Hypertension     Lumbar laminectomy     Osteoporosis     Dr. Omega Duron -Endocrine    Peptic ulcer disease     Peripheral neuropathy     Prediabetes     Pulmonary embolus     2009  bilateral - after back sx    Scoliosis     Shoulder fracture 2013    right (fell off curb)    Sleep apnea     no CPAP (has never used)    Toenail fungus     Vitamin B12 deficiency        Past Surgical History:   Procedure Laterality Date    BREAST SURGERY PROCEDURE UNLISTED Bilateral     x2  breast biopsy, benign fibroadenoma    HX BACK SURGERY      HX COLONOSCOPY      2009  repeat 2019    HX GYN      HX KNEE ARTHROSCOPY      Right knee surgery    HX KNEE REPLACEMENT      2014 left    HX KNEE REPLACEMENT Right 12/2016    HX LUMBAR LAMINECTOMY      x2    HX PARTIAL HYSTERECTOMY      partial    HX TOTAL ABDOMINAL HYSTERECTOMY         Current Outpatient Prescriptions   Medication Sig Dispense Refill    atorvastatin (LIPITOR) 10 mg tablet Take 10 mg by mouth daily.  [START ON 10/21/2017] oxyCODONE-acetaminophen (PERCOCET 7.5) 7.5-325 mg per tablet Take 1 Tab by mouth three (3) times daily as needed for Pain for up to 30 days. Max Daily Amount: 3 Tabs. 90 Tab 0    aspirin delayed-release 81 mg tablet Take 1 Tab by mouth daily.  Start 5/1/2017 30 Tab 0    ferrous sulfate 325 mg (65 mg iron) tablet Take 1 Tab by mouth two (2) times daily (with meals). 30 Tab 0    magnesium citrate solution Take 148 mL by mouth daily as needed for Other (Constipation). 1 Bottle 0    chlorthalidone (HYGROTEN) 25 mg tablet Take 1 Tab by mouth daily. (Patient taking differently: Take 25 mg by mouth daily. Indications: Hypertension) 90 Tab 3    cholecalciferol (VITAMIN D3) 1,000 unit cap Take 4 Caps by mouth daily.  omeprazole (PRILOSEC) 20 mg capsule Take 20 mg by mouth daily.  gabapentin (NEURONTIN) 300 mg capsule Take 1 Cap by mouth nightly.  calcium-vitamin D (OYSTER SHELL) 500 mg(1,250mg) -200 unit per tablet Take 1 Tab by mouth two (2) times daily (with meals).  carboxymethylcellulose sodium (REFRESH LIQUIGEL) 1 % dlgl Apply 1 Drop to eye as needed. Indications: DRY EYE      [START ON 11/20/2017] oxyCODONE-acetaminophen (PERCOCET 7.5) 7.5-325 mg per tablet Take 1 Tab by mouth three (3) times daily as needed for Pain for up to 30 days. Max Daily Amount: 3 Tabs. 90 Tab 0    [START ON 12/19/2017] oxyCODONE-acetaminophen (PERCOCET 7.5) 7.5-325 mg per tablet Take 1 Tab by mouth three (3) times daily as needed for Pain for up to 30 days. Max Daily Amount: 3 Tabs. 90 Tab 0       Allergies   Allergen Reactions    Forteo [Teriparatide] Other (comments) and Nausea and Vomiting     abd pains  abd pains    Lipitor [Atorvastatin] Other (comments) and Nausea and Vomiting     myalgias  myalgias    Livalo [Pitavastatin] Other (comments) and Nausea and Vomiting     myalgias  myalgias    Seafood Hives    Sulfa (Sulfonamide Antibiotics) Other (comments) and Hives     hives    Sulfur Other (comments)     rash    Zocor [Simvastatin] Other (comments) and Nausea and Vomiting     myalgias  myalgias       ROS negative, except as stated in HPI      Objective:     Physical Exam:  There were no vitals taken for this visit.     General: Well developed, well nourished [de-identified] y.o. female in no acute distress  ENMT: normocephalic, atraumatic mouth:clear, no overt lesions, oral mucosa pink and moist  Neck: supple, no masses, no adenopathy or carotid bruits, trachea midline  Skin: warm, smooth, dry and well perfused  Breasts: Examined in both the supine and upright positions. There was no supraclavicular, infraclavicular, or axillary lympadenopathy. There were no dominant masses, no skin changes, no asymmetry identified.   Respiratory: clear to auscultation bilaterally, no wheeze, rhonchi or rales, excursions normal and symmetrical  Cardiovascular: Regular rate and rhythm, no murmurs, clicks, gallops or rubs, no edema or varicosities  Gastrointestinal: soft, nontender, nondistended, normoactive bowel sounds, no hernias, no hepatosplenomegaly  Musculoskeletal: warm, well-perfused, no tenderness or swelling, normal gait/station, walks with cane  Neuro: sensation and strength grossly intact and symmetrical  Psych: alert and oriented to person, place and time      Mammo done at [facility], no suspicious calcifications, BIRADS 1    Assessment:     Cecilia Rob is a [de-identified] y.o. female with a history of benign breast biopsy in 2010, radiographicaly benign and clinically benign exam.    Plan:   -repeat mammo in 1  years  -Follow up soon after to review most recent imaging study

## 2018-01-02 ENCOUNTER — OFFICE VISIT (OUTPATIENT)
Dept: PAIN MANAGEMENT | Age: 83
End: 2018-01-02

## 2018-01-02 VITALS
RESPIRATION RATE: 14 BRPM | DIASTOLIC BLOOD PRESSURE: 64 MMHG | TEMPERATURE: 97.3 F | WEIGHT: 150 LBS | HEART RATE: 66 BPM | BODY MASS INDEX: 28.32 KG/M2 | SYSTOLIC BLOOD PRESSURE: 136 MMHG | HEIGHT: 61 IN

## 2018-01-02 DIAGNOSIS — M17.11 PRIMARY OSTEOARTHRITIS OF RIGHT KNEE: ICD-10-CM

## 2018-01-02 DIAGNOSIS — M48.061 LUMBAR FORAMINAL STENOSIS: ICD-10-CM

## 2018-01-02 DIAGNOSIS — M15.9 PRIMARY OSTEOARTHRITIS INVOLVING MULTIPLE JOINTS: ICD-10-CM

## 2018-01-02 DIAGNOSIS — G89.4 CHRONIC PAIN DISORDER: ICD-10-CM

## 2018-01-02 DIAGNOSIS — Z79.899 ENCOUNTER FOR LONG-TERM (CURRENT) USE OF HIGH-RISK MEDICATION: Primary | ICD-10-CM

## 2018-01-02 DIAGNOSIS — M54.42 CHRONIC MIDLINE LOW BACK PAIN WITH BILATERAL SCIATICA: ICD-10-CM

## 2018-01-02 DIAGNOSIS — M25.561 RIGHT KNEE PAIN, UNSPECIFIED CHRONICITY: ICD-10-CM

## 2018-01-02 DIAGNOSIS — M47.816 LUMBAR FACET ARTHROPATHY: ICD-10-CM

## 2018-01-02 DIAGNOSIS — M16.11 OSTEOARTHRITIS OF RIGHT HIP, UNSPECIFIED OSTEOARTHRITIS TYPE: ICD-10-CM

## 2018-01-02 DIAGNOSIS — M51.26 LUMBAR DISC HERNIATION: ICD-10-CM

## 2018-01-02 DIAGNOSIS — M51.36 DDD (DEGENERATIVE DISC DISEASE), LUMBAR: ICD-10-CM

## 2018-01-02 DIAGNOSIS — M54.41 CHRONIC MIDLINE LOW BACK PAIN WITH BILATERAL SCIATICA: ICD-10-CM

## 2018-01-02 DIAGNOSIS — G89.29 CHRONIC MIDLINE LOW BACK PAIN WITH BILATERAL SCIATICA: ICD-10-CM

## 2018-01-02 LAB
ALCOHOL UR POC: NORMAL
AMPHETAMINES UR POC: NORMAL
BARBITURATES UR POC: NORMAL
BENZODIAZEPINES UR POC: NORMAL
BUPRENORPHINE UR POC: NORMAL
CANNABINOIDS UR POC: NORMAL
CARISOPRODOL UR POC: NORMAL
COCAINE UR POC: NORMAL
FENTANYL UR POC: NORMAL
MDMA/ECSTASY UR POC: NORMAL
METHADONE UR POC: NORMAL
METHAMPHETAMINE UR POC: NORMAL
METHYLPHENIDATE UR POC: NORMAL
OPIATES UR POC: NORMAL
OXYCODONE UR POC: NORMAL
PHENCYCLIDINE UR POC: NORMAL
PROPOXYPHENE UR POC: NORMAL
TRAMADOL UR POC: NORMAL
TRICYCLICS UR POC: NORMAL

## 2018-01-02 RX ORDER — OXYCODONE AND ACETAMINOPHEN 7.5; 325 MG/1; MG/1
TABLET ORAL
COMMUNITY
End: 2018-04-05 | Stop reason: SDUPTHER

## 2018-01-02 RX ORDER — OXYCODONE AND ACETAMINOPHEN 7.5; 325 MG/1; MG/1
1 TABLET ORAL
Qty: 90 TAB | Refills: 0 | Status: SHIPPED | OUTPATIENT
Start: 2018-02-02 | End: 2018-04-02 | Stop reason: SDUPTHER

## 2018-01-02 RX ORDER — OXYCODONE AND ACETAMINOPHEN 7.5; 325 MG/1; MG/1
1 TABLET ORAL
Qty: 90 TAB | Refills: 0 | Status: SHIPPED | OUTPATIENT
Start: 2018-03-03 | End: 2018-04-02 | Stop reason: SDUPTHER

## 2018-01-02 NOTE — PROGRESS NOTES
HISTORY OF PRESENT ILLNESS  Yazmin Mitchell is a [de-identified] y.o. female    HPI: Ms. Janna Knight  returns today for f/u of chronic low back with multilevel lumbar degenerative changes. Also has c/o leg and right knee pain. 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 Yovany Arteaga. Left TKA in November of 2014 by Dr. Mark Way. Ms. Janna Knight continues unchanged since last visit. She has been doing very well with her current treatment plan which has been offering significant pain control. She continues with her home therapy with good improvement since her right JUDITH on 4/17/2017. We have discussed possibly using H wave for her as well. We will discuss this again next visit. She is otherwise doing well with no new complaints today. I will have her follow-up in 3 months for further evaluation and recommendation or sooner if needed. Current medication management consists of Percocet 7.5/325 TID PRN  Medications are helping with pain control and quality of life. Her pain is 6-7/10 with medication and 8/10 without. Pt describes pain as aching and throbbing. Aggravating factors include standing, bending, and walking. Relieved with rest, medication, and avoiding painful activities. Current treatment is helping to improve general activity, mood, walking, sleep, enjoyment of life    In the past 30 days, the patient reports approximately 20-30% pain relief with current treatment/medications. She  is otherwise doing well with no other complaints today. She denies any adverse events including nausea, vomiting, dizziness, increased constipation, hallucinations, or seizures. POC UDS today. Confirmation pending.        Allergies   Allergen Reactions    Forteo [Teriparatide] Other (comments) and Nausea and Vomiting     abd pains  abd pains    Lipitor [Atorvastatin] Other (comments) and Nausea and Vomiting     myalgias  myalgias    Livalo [Pitavastatin] Other (comments) and Nausea and Vomiting     myalgias  myalgias    Seafood Hives    Sulfa (Sulfonamide Antibiotics) Other (comments) and Hives     hives    Sulfur Other (comments)     rash    Zocor [Simvastatin] Other (comments) and Nausea and Vomiting     myalgias  myalgias       Past Surgical History:   Procedure Laterality Date    BREAST SURGERY PROCEDURE UNLISTED Bilateral     x2  breast biopsy, benign fibroadenoma    HX BACK SURGERY      HX COLONOSCOPY      2009  repeat 2019    HX GYN      HX KNEE ARTHROSCOPY      Right knee surgery    HX KNEE REPLACEMENT      2014 left    HX KNEE REPLACEMENT Right 12/2016    HX LUMBAR LAMINECTOMY      x2    HX PARTIAL HYSTERECTOMY      partial    HX TOTAL ABDOMINAL HYSTERECTOMY           Review of Systems   Constitutional: Negative for chills, fever and weight loss. HENT: Negative for congestion and sore throat. Eyes: Negative for blurred vision and double vision. Respiratory: Negative for cough, shortness of breath and wheezing. Cardiovascular: Negative for chest pain and palpitations. Gastrointestinal: Negative for constipation, diarrhea, heartburn, nausea and vomiting. Genitourinary: Negative. Neurological: Negative for dizziness, seizures and loss of consciousness. Endo/Heme/Allergies: Does not bruise/bleed easily. Psychiatric/Behavioral: Negative for depression. The patient is not nervous/anxious and does not have insomnia. Physical Exam   Constitutional: She is oriented to person, place, and time and well-developed, well-nourished, and in no distress. No distress. HENT:   Head: Normocephalic and atraumatic. Eyes: EOM are normal.   Pulmonary/Chest: Effort normal.   Musculoskeletal:        Right knee: Tenderness found. Lumbar back: She exhibits decreased range of motion, tenderness and pain. Neurological: She is alert and oriented to person, place, and time. Gait ( ambulating with a cane) abnormal.   Skin: Skin is warm and dry.  No rash noted. She is not diaphoretic. No erythema. Psychiatric: Mood, memory, affect and judgment normal.   Nursing note and vitals reviewed. ASSESSMENT:    1. Encounter for long-term (current) use of high-risk medication    2. DDD (degenerative disc disease), lumbar    3. Lumbar disc herniation    4. Lumbar facet arthropathy    5. Lumbar foraminal stenosis    6. Primary osteoarthritis involving multiple joints    7. Primary osteoarthritis of right knee    8. Chronic pain disorder    9. Right knee pain, unspecified chronicity    10. Chronic midline low back pain with bilateral sciatica    11. Osteoarthritis of right hip, unspecified osteoarthritis type         [place] Prescription Monitoring Program was reviewed which does not demonstrate aberrancies and/or inconsistencies with regard to the historical prescribing of controlled medications to this patient by other providers. PLAN / Pt Instructions:  1. Continue current plan with no evidence of addiction or diversion. Stable on current medication without adverse events. 2. Refill oxycodone 7.5/325 mg 3 times daily as needed. 3. Continue home therapy for right hip  4. Continue to follow-up with orthopedic surgeon regarding right hip   5. Discussed risks of addiction, dependency, and opioid induced hyperalgesia. 6. Return to clinic in 3 months       Medications Ordered Today   Medications    oxyCODONE-acetaminophen (PERCOCET 7.5) 7.5-325 mg per tablet     Sig: Take 1 Tab by mouth three (3) times daily as needed for Pain for up to 30 days. Max Daily Amount: 3 Tabs. Dispense:  90 Tab     Refill:  0    oxyCODONE-acetaminophen (PERCOCET 7.5) 7.5-325 mg per tablet     Sig: Take 1 Tab by mouth three (3) times daily as needed for Pain for up to 30 days. Max Daily Amount: 3 Tabs. Dispense:  90 Tab     Refill:  0       Pain medications prescribed with the objective of pain relief and improved physical and psychosocial function in this patient.     Spent 25 minutes with patient today reviewing the treatment plan, goals of treatment plan, and limitations of the treatment plan, to include the potential for side effects from medications and procedures. Kelsey Samson, [address] 1/2/2018      Note: Please excuse any typographical errors. Voice recognition software was used for this note and may cause mistakes.

## 2018-01-02 NOTE — PROGRESS NOTES
Nursing Notes    Patient presents to the office today in follow-up. Patient rates her pain at 1/10 on the numerical pain scale. Reviewed medications with counts as follows:    Rx Date filled Qty Dispensed Pill Count Last Dose Short   Oxycodone 7.5-325mg 12/01/17 90 16 yesterday no                                          Comments:     POC UDS was performed in office today    Any new labs or imaging since last appointment? YES,lab, PCP    Have you been to an emergency room (ER) or urgent care clinic since your last visit? NO            Have you been hospitalized since your last visit? NO     If yes, where, when, and reason for visit? Have you seen or consulted any other health care providers outside of the [address] [place] [address]  since your last visit? YES,PCP     If yes, where, when, and reason for visit? HM deferred to pcp.  The pt provided a urine specimen today in the office for routine drug testing, RBVO

## 2018-01-02 NOTE — MR AVS SNAPSHOT
Visit Information Date & Time Provider Department Dept. Phone Encounter #  
 1/2/2018  2:40 PM Cole Colindres, [address] [place] [address] Pain Management   Follow-up Instructions Return in about 3 months (around 4/2/2018). Upcoming Health Maintenance Date Due DTaP/Tdap/Td series (1 - Tdap) 4/3/1956 Pneumococcal 65+ Low/Medium Risk (2 of 2 - PPSV23) 11/1/2012 GLAUCOMA SCREENING Q2Y 6/10/2016 MEDICARE YEARLY EXAM 10/29/2016 Influenza Age 5 to Adult 8/1/2017 COLONOSCOPY 6/1/2019 Allergies as of 1/2/2018  Review Complete On: 1/2/2018 By: JULIETTE Anderson Severity Noted Reaction Type Reactions Forteo [Teriparatide]  09/13/2016    Other (comments), Nausea and Vomiting  
 abd pains 
abd pains Lipitor [Atorvastatin]  02/03/2017    Other (comments), Nausea and Vomiting  
 myalgias 
myalgias Livalo [Pitavastatin]  02/03/2017    Other (comments), Nausea and Vomiting  
 myalgias 
myalgias Seafood  07/03/2011   Systemic Hives Sulfa (Sulfonamide Antibiotics)  10/11/2007    Other (comments), Hives  
 hives Sulfur    Other (comments)  
 rash Zocor [Simvastatin]  02/03/2017    Other (comments), Nausea and Vomiting  
 myalgias 
myalgias Current Immunizations  Reviewed on 8/25/2014 Name Date Pneumococcal Vaccine (Unspecified Type) 11/1/2007 Not reviewed this visit You Were Diagnosed With   
  
 Codes Comments Encounter for long-term (current) use of high-risk medication    -  Primary ICD-10-CM: D98.379 ICD-9-CM: V58.69   
 DDD (degenerative disc disease), lumbar     ICD-10-CM: M51.36 
ICD-9-CM: 722.52 Lumbar disc herniation     ICD-10-CM: M51.26 
ICD-9-CM: 722.10 Lumbar facet arthropathy     ICD-10-CM: M12.88 ICD-9-CM: 721.3 Lumbar foraminal stenosis     ICD-10-CM: M99.83 ICD-9-CM: 724.02   
 Primary osteoarthritis involving multiple joints     ICD-10-CM: M15.0 ICD-9-CM: 715.09 Primary osteoarthritis of right knee     ICD-10-CM: M17.11 ICD-9-CM: 715.16 Chronic pain disorder     ICD-10-CM: G89.4 ICD-9-CM: 338.4 Right knee pain, unspecified chronicity     ICD-10-CM: M25.561 ICD-9-CM: 719.46 Chronic midline low back pain with bilateral sciatica     ICD-10-CM: M54.41, M54.42, G89.29 ICD-9-CM: 724.2, 724.3, 338.29 Osteoarthritis of right hip, unspecified osteoarthritis type     ICD-10-CM: M16.11 
ICD-9-CM: 715.95 Vitals BP Pulse Temp Resp Height(growth percentile) Weight(growth percentile) 136/64 (BP 1 Location: Left arm, BP Patient Position: Sitting) 66 97.3 °F (36.3 °C) (Oral) 14 5' 1\" (1.549 m) 150 lb (68 kg) BMI OB Status Smoking Status 28.34 kg/m2 Hysterectomy Former Smoker Vitals History BMI and BSA Data Body Mass Index Body Surface Area  
 28.34 kg/m 2 1.71 m 2 Preferred Pharmacy Pharmacy Name Phone [address] [facility], [address] [place] [address] [phone] Your Updated Medication List  
  
   
This list is accurate as of: 1/2/18  3:38 PM.  Always use your most recent med list.  
  
  
  
  
 aspirin delayed-release 81 mg tablet Take 1 Tab by mouth daily. Start 5/1/2017  
  
 calcium-vitamin D 500 mg(1,250mg) -200 unit per tablet Commonly known as:  OYSTER SHELL Take 1 Tab by mouth two (2) times daily (with meals). chlorthalidone 25 mg tablet Commonly known as:  Matt Crater Take 1 Tab by mouth daily. cholecalciferol 1,000 unit Cap Commonly known as:  VITAMIN D3 Take 4 Caps by mouth daily. ferrous sulfate 325 mg (65 mg iron) tablet Take 1 Tab by mouth two (2) times daily (with meals). gabapentin 300 mg capsule Commonly known as:  NEURONTIN Take 1 Cap by mouth nightly. LIPITOR 10 mg tablet Generic drug:  atorvastatin Take 10 mg by mouth daily. omeprazole 20 mg capsule Commonly known as:  PRILOSEC Take 20 mg by mouth daily. * oxyCODONE-acetaminophen 7.5-325 mg per tablet Commonly known as:  PERCOCET 7.5 Take  by mouth. * oxyCODONE-acetaminophen 7.5-325 mg per tablet Commonly known as:  PERCOCET 7.5 Take 1 Tab by mouth three (3) times daily as needed for Pain for up to 30 days. Max Daily Amount: 3 Tabs. Start taking on:  2/2/2018 * oxyCODONE-acetaminophen 7.5-325 mg per tablet Commonly known as:  PERCOCET 7.5 Take 1 Tab by mouth three (3) times daily as needed for Pain for up to 30 days. Max Daily Amount: 3 Tabs. Start taking on:  3/3/2018 REFRESH LIQUIGEL 1 % Dlgl Generic drug:  carboxymethylcellulose sodium Apply 1 Drop to eye as needed. Indications: DRY EYE  
  
 * Notice: This list has 3 medication(s) that are the same as other medications prescribed for you. Read the directions carefully, and ask your doctor or other care provider to review them with you. Prescriptions Printed Refills  
 oxyCODONE-acetaminophen (PERCOCET 7.5) 7.5-325 mg per tablet 0 Starting on: 2/2/2018 Sig: Take 1 Tab by mouth three (3) times daily as needed for Pain for up to 30 days. Max Daily Amount: 3 Tabs. Class: Print Route: Oral  
 oxyCODONE-acetaminophen (PERCOCET 7.5) 7.5-325 mg per tablet 0 Starting on: 3/3/2018 Sig: Take 1 Tab by mouth three (3) times daily as needed for Pain for up to 30 days. Max Daily Amount: 3 Tabs. Class: Print Route: Oral  
  
We Performed the Following AMB POC DRUG SCREEN () [ [facility]] DRUG SCREEN [[id number] Custom] Follow-up Instructions Return in about 3 months (around 4/2/2018). Patient Instructions 1. Continue current plan with no evidence of addiction or diversion. Stable on current medication without adverse events. 2. Refill oxycodone 7.5/325 mg 3 times daily as needed. 3. Continue home therapy for right hip 4. Continue to follow-up with orthopedic surgeon regarding right hip 5. Discussed risks of addiction, dependency, and opioid induced hyperalgesia. 6. Return to clinic in 3 months Introducing \[facility]\"" & HEALTH SERVICES! Dear Anusha Alvarado: Thank you for requesting a Publ[employer] account. Our records indicate that you already have an active Publons account. You can access your account anytime at [email or web address] Did you know that you can access your hospital and ER discharge instructions at any time in Publons? You can also review all of your test results from your hospital stay or ER visit. Additional Information If you have questions, please visit the Frequently Asked Questions section of the Publ[employer] website at [email or web address] Remember, Publons is NOT to be used for urgent needs. For medical emergencies, dial 911. Now available from your iPhone and Android! Please provide this summary of care documentation to your next provider. Your primary care clinician is listed as Jerel Pearson. If you have any questions after today's visit, please call [phone].

## 2018-04-02 ENCOUNTER — OFFICE VISIT (OUTPATIENT)
Dept: PAIN MANAGEMENT | Age: 83
End: 2018-04-02

## 2018-04-02 VITALS
TEMPERATURE: 96.8 F | WEIGHT: 162 LBS | SYSTOLIC BLOOD PRESSURE: 126 MMHG | BODY MASS INDEX: 30.58 KG/M2 | RESPIRATION RATE: 14 BRPM | HEART RATE: 65 BPM | DIASTOLIC BLOOD PRESSURE: 62 MMHG | HEIGHT: 61 IN

## 2018-04-02 DIAGNOSIS — M51.36 DDD (DEGENERATIVE DISC DISEASE), LUMBAR: ICD-10-CM

## 2018-04-02 DIAGNOSIS — M16.11 OSTEOARTHRITIS OF RIGHT HIP, UNSPECIFIED OSTEOARTHRITIS TYPE: ICD-10-CM

## 2018-04-02 DIAGNOSIS — G89.4 CHRONIC PAIN DISORDER: ICD-10-CM

## 2018-04-02 DIAGNOSIS — M47.816 LUMBAR FACET ARTHROPATHY: ICD-10-CM

## 2018-04-02 DIAGNOSIS — M25.551 BILATERAL HIP PAIN: Primary | ICD-10-CM

## 2018-04-02 DIAGNOSIS — M54.41 CHRONIC MIDLINE LOW BACK PAIN WITH BILATERAL SCIATICA: ICD-10-CM

## 2018-04-02 DIAGNOSIS — M25.552 BILATERAL HIP PAIN: Primary | ICD-10-CM

## 2018-04-02 DIAGNOSIS — M15.9 PRIMARY OSTEOARTHRITIS INVOLVING MULTIPLE JOINTS: ICD-10-CM

## 2018-04-02 DIAGNOSIS — M54.42 CHRONIC MIDLINE LOW BACK PAIN WITH BILATERAL SCIATICA: ICD-10-CM

## 2018-04-02 DIAGNOSIS — G89.29 CHRONIC MIDLINE LOW BACK PAIN WITH BILATERAL SCIATICA: ICD-10-CM

## 2018-04-02 DIAGNOSIS — M51.24 THORACIC DISC HERNIATION: ICD-10-CM

## 2018-04-02 DIAGNOSIS — M48.061 LUMBAR FORAMINAL STENOSIS: ICD-10-CM

## 2018-04-02 RX ORDER — OXYCODONE AND ACETAMINOPHEN 7.5; 325 MG/1; MG/1
1 TABLET ORAL
Qty: 90 TAB | Refills: 0 | Status: SHIPPED | OUTPATIENT
Start: 2018-06-19 | End: 2018-04-05 | Stop reason: SDUPTHER

## 2018-04-02 RX ORDER — EZETIMIBE 10 MG/1
10 TABLET ORAL DAILY
Refills: 1 | COMMUNITY
Start: 2018-03-27 | End: 2018-04-18

## 2018-04-02 RX ORDER — OXYCODONE AND ACETAMINOPHEN 7.5; 325 MG/1; MG/1
1 TABLET ORAL
Qty: 90 TAB | Refills: 0 | Status: SHIPPED | OUTPATIENT
Start: 2018-04-21 | End: 2018-06-28 | Stop reason: SDUPTHER

## 2018-04-02 RX ORDER — OXYCODONE AND ACETAMINOPHEN 7.5; 325 MG/1; MG/1
1 TABLET ORAL
Qty: 90 TAB | Refills: 0 | Status: SHIPPED | OUTPATIENT
Start: 2018-05-20 | End: 2018-04-05 | Stop reason: SDUPTHER

## 2018-04-02 NOTE — PROGRESS NOTES
Nursing Notes    Patient presents to the office today in follow-up. Patient rates her pain at 5/10 on the numerical pain scale. Reviewed medications with counts as follows:    Rx Date filled Qty Dispensed Pill Count Last Dose Short   Percocet 7.5 mg 03/22/18 90 77 This am  no         Comments: Patient is here today for a follow up appt today she states her pain level today is a 5  She states she has seen her pcm since her last appt here    POC UDS was not  performed in office today    Any new labs or imaging since last appointment? NO    Have you been to an emergency room (ER) or urgent care clinic since your last visit? NO            Have you been hospitalized since your last visit? NO     If yes, where, when, and reason for visit? Have you seen or consulted any other health care providers outside of the [facility]  since your last visit? YES PCM     If yes, where, when, and reason for visit? HM deferred to pcp.

## 2018-04-02 NOTE — PROGRESS NOTES
HISTORY OF PRESENT ILLNESS  Melinda Mendenhall is a [de-identified] y.o. female    HPI: Ms. Phil Lam  returns today for f/u of chronic low back with multilevel lumbar degenerative changes. Also has c/o leg and right knee pain. 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 Navjot Bergman. Left TKA in November of 2014 by Dr. Apolinar Trejo. Ms. Phil Lam continues unchanged since last visit. She has been doing very well since her right JUDITH on 4/17/2017 by Dr. Navjot Bergman. Unfortunately Dr. Navjot Bergman has retired. She does report that her left hip is now began to bother her more. We discussed several different options in the office today. I have recommended that she discuss intertrochanteric hip injection with Dr. Lyudmila Woodward. We discussed trying H wave for her low back pain last visit. I will have this scheduled as soon as possible. She is otherwise doing well with no other complaints. I will have her follow-up in 3 months or sooner if needed. Current medication management consists of Percocet 7.5/325 TID PRN  Medications are helping with pain control and quality of life. Her pain is 6-7/10 with medication and 8/10 without. Pt describes pain as aching and throbbing. Aggravating factors include standing, bending, and walking. Relieved with rest, medication, and avoiding painful activities. Current treatment is helping to improve general activity, mood, walking, sleep, enjoyment of life    In the past 30 days, the patient reports approximately 20-30% pain relief with current treatment/medications. She  is otherwise doing well with no other complaints today. She denies any adverse events including nausea, vomiting, dizziness, increased constipation, hallucinations, or seizures. POC UDS today. Confirmation pending.        Allergies   Allergen Reactions    Forteo [Teriparatide] Other (comments) and Nausea and Vomiting     abd pains  abd pains    Lipitor [Atorvastatin] Other (comments) and Nausea and Vomiting     myalgias  myalgias    Livalo [Pitavastatin] Other (comments) and Nausea and Vomiting     myalgias  myalgias    Seafood Hives    Sulfa (Sulfonamide Antibiotics) Other (comments) and Hives     hives    Sulfur Other (comments)     rash    Zocor [Simvastatin] Other (comments) and Nausea and Vomiting     myalgias  myalgias       Past Surgical History:   Procedure Laterality Date    BREAST SURGERY PROCEDURE UNLISTED Bilateral     x2  breast biopsy, benign fibroadenoma    HX BACK SURGERY      HX COLONOSCOPY      2009  repeat 2019    HX GYN      HX KNEE ARTHROSCOPY      Right knee surgery    HX KNEE REPLACEMENT      2014 left    HX KNEE REPLACEMENT Right 12/2016    HX LUMBAR LAMINECTOMY      x2    HX PARTIAL HYSTERECTOMY      partial    HX TOTAL ABDOMINAL HYSTERECTOMY           Review of Systems   Constitutional: Negative for chills, fever and weight loss. HENT: Negative for congestion and sore throat. Eyes: Negative for blurred vision and double vision. Respiratory: Negative for cough, shortness of breath and wheezing. Cardiovascular: Negative for chest pain and palpitations. Gastrointestinal: Negative for constipation, diarrhea, heartburn, nausea and vomiting. Genitourinary: Negative. Neurological: Negative for dizziness, seizures and loss of consciousness. Endo/Heme/Allergies: Does not bruise/bleed easily. Psychiatric/Behavioral: Negative for depression. The patient is not nervous/anxious and does not have insomnia. Physical Exam   Constitutional: She is oriented to person, place, and time and well-developed, well-nourished, and in no distress. No distress. HENT:   Head: Normocephalic and atraumatic. Eyes: EOM are normal.   Pulmonary/Chest: Effort normal.   Musculoskeletal:        Right knee: Tenderness found. Lumbar back: She exhibits decreased range of motion, tenderness and pain.    Neurological: She is alert and oriented to person, place, and time. Gait ( ambulating with a cane) abnormal.   Skin: Skin is warm and dry. No rash noted. She is not diaphoretic. No erythema. Psychiatric: Mood, memory, affect and judgment normal.   Nursing note and vitals reviewed. ASSESSMENT:    1. Bilateral hip pain    2. Chronic pain disorder    3. DDD (degenerative disc disease), lumbar    4. Lumbar facet arthropathy ([address]    5. Lumbar foraminal stenosis    6. Thoracic disc herniation    7. Primary osteoarthritis involving multiple joints    8. Chronic midline low back pain with bilateral sciatica    9. Osteoarthritis of right hip, unspecified osteoarthritis type         [place] Prescription Monitoring Program was reviewed which does not demonstrate aberrancies and/or inconsistencies with regard to the historical prescribing of controlled medications to this patient by other providers. PLAN / Pt Instructions:  1. Continue current plan with no evidence of addiction or diversion. Stable on current medication without adverse events. 2. Refill oxycodone 7.5/325 mg 3 times daily as needed. 3. Continue home therapy for right hip  4. Schedule appointment with Dr. Chacha Amador to discuss possible left intertrochanteric hip injection  5. Schedule appointment for H wave therapy  6. Continue to follow-up with orthopedic surgeon regarding right hip   7. Discussed risks of addiction, dependency, and opioid induced hyperalgesia. 8. Return to clinic in 3 months       Medications Ordered Today   Medications    oxyCODONE-acetaminophen (PERCOCET 7.5) 7.5-325 mg per tablet     Sig: Take 1 Tab by mouth three (3) times daily as needed for Pain for up to 30 days. Max Daily Amount: 3 Tabs. Dispense:  90 Tab     Refill:  0    oxyCODONE-acetaminophen (PERCOCET 7.5) 7.5-325 mg per tablet     Sig: Take 1 Tab by mouth three (3) times daily as needed for Pain for up to 30 days. Max Daily Amount: 3 Tabs.      Dispense:  90 Tab     Refill:  0    oxyCODONE-acetaminophen (PERCOCET 7.5) 7.5-325 mg per tablet     Sig: Take 1 Tab by mouth three (3) times daily as needed for Pain for up to 30 days. Max Daily Amount: 3 Tabs. Dispense:  90 Tab     Refill:  0       Pain medications prescribed with the objective of pain relief and improved physical and psychosocial function in this patient. Spent 25 minutes with patient today reviewing the treatment plan, goals of treatment plan, and limitations of the treatment plan, to include the potential for side effects from medications and procedures. Elsa Al 4/2/2018      Note: Please excuse any typographical errors. Voice recognition software was used for this note and may cause mistakes.

## 2018-04-02 NOTE — MR AVS SNAPSHOT
[address] [address] 
[phone] Patient: Sung Greer MRN: [id number] FDJ:[birth year] Visit Information Date & Time Provider Department Dept. Phone Encounter #  
 4/2/2018 10:00 [id number] Follow-up Instructions Return in about 3 months (around 7/2/2018). Upcoming Health Maintenance Date Due DTaP/Tdap/Td series (1 - Tdap) 4/3/1958 Pneumococcal 65+ Low/Medium Risk (2 of 2 - PPSV23) 11/1/2012 GLAUCOMA SCREENING Q2Y 6/10/2016 Influenza Age 5 to Adult 8/1/2017 MEDICARE YEARLY EXAM 3/14/2018 COLONOSCOPY 6/1/2019 Allergies as of 4/2/2018  Review Complete On: 4/2/2018 By: JULIETTE Pruitt Severity Noted Reaction Type Reactions Forteo [Teriparatide]  09/13/2016    Other (comments), Nausea and Vomiting  
 abd pains 
abd pains Lipitor [Atorvastatin]  02/03/2017    Other (comments), Nausea and Vomiting  
 myalgias 
myalgias Livalo [Pitavastatin]  02/03/2017    Other (comments), Nausea and Vomiting  
 myalgias 
myalgias Seafood  07/03/2011   Systemic Hives Sulfa (Sulfonamide Antibiotics)  10/11/2007    Other (comments), Hives  
 hives Sulfur    Other (comments)  
 rash Zocor [Simvastatin]  02/03/2017    Other (comments), Nausea and Vomiting  
 myalgias 
myalgias Current Immunizations  Reviewed on 8/25/2014 Name Date Pneumococcal Vaccine (Unspecified Type) 11/1/2007 Not reviewed this visit You Were Diagnosed With   
  
 Codes Comments Bilateral hip pain    -  Primary ICD-10-CM: M25.551, M25.552 ICD-9-CM: 719.45 Chronic pain disorder     ICD-10-CM: G89.4 ICD-9-CM: 338.4 DDD (degenerative disc disease), lumbar     ICD-10-CM: M51.36 
ICD-9-CM: 722.52 Lumbar facet arthropathy (HCC)     ICD-10-CM: M46.96 
ICD-9-CM: 721.3 Lumbar foraminal stenosis     ICD-10-CM: M99.83 ICD-9-CM: 724.02 Thoracic disc herniation     ICD-10-CM: M51.24 
ICD-9-CM: 722.11 Primary osteoarthritis involving multiple joints     ICD-10-CM: M15.0 ICD-9-CM: 715.09 Chronic midline low back pain with bilateral sciatica     ICD-10-CM: M54.41, M54.42, G89.29 ICD-9-CM: 724.2, 724.3, 338.29 Osteoarthritis of right hip, unspecified osteoarthritis type     ICD-10-CM: M16.11 
ICD-9-CM: 715.95 Vitals BP Pulse Temp Resp Height(growth percentile) Weight(growth percentile) 126/62 (BP 1 Location: Right arm, BP Patient Position: Sitting) 65 96.8 °F (36 °C) 14 5' 1\" (1.549 m) 162 lb (73.5 kg) BMI OB Status Smoking Status 30.61 kg/m2 Hysterectomy Former Smoker BMI and BSA Data Body Mass Index Body Surface Area  
 30.61 kg/m 2 1.78 m 2 Preferred Pharmacy Pharmacy Name Phone [address], [address] [place] [address] [phone] Your Updated Medication List  
  
   
This list is accurate as of 4/2/18 10:33 AM.  Always use your most recent med list.  
  
  
  
  
 aspirin delayed-release 81 mg tablet Take 1 Tab by mouth daily. Start 5/1/2017  
  
 calcium-vitamin D 500 mg(1,250mg) -200 unit per tablet Commonly known as:  OYSTER SHELL Take 1 Tab by mouth two (2) times daily (with meals). chlorthalidone 25 mg tablet Commonly known as:  Roger Schlichter Take 1 Tab by mouth daily. cholecalciferol 1,000 unit Cap Commonly known as:  VITAMIN D3 Take 4 Caps by mouth daily. ezetimibe 10 mg tablet Commonly known as:  Darus Pont Take 10 mg by mouth daily. ferrous sulfate 325 mg (65 mg iron) tablet Take 1 Tab by mouth two (2) times daily (with meals). gabapentin 300 mg capsule Commonly known as:  NEURONTIN Take 1 Cap by mouth nightly. LIPITOR 10 mg tablet Generic drug:  atorvastatin Take 10 mg by mouth daily. omeprazole 20 mg capsule Commonly known as:  PRILOSEC Take 20 mg by mouth daily. * oxyCODONE-acetaminophen 7.5-325 mg per tablet Commonly known as:  PERCOCET 7.5 Take  by mouth. * oxyCODONE-acetaminophen 7.5-325 mg per tablet Commonly known as:  PERCOCET 7.5 Take 1 Tab by mouth three (3) times daily as needed for Pain for up to 30 days. Max Daily Amount: 3 Tabs. Start taking on:  4/21/2018 * oxyCODONE-acetaminophen 7.5-325 mg per tablet Commonly known as:  PERCOCET 7.5 Take 1 Tab by mouth three (3) times daily as needed for Pain for up to 30 days. Max Daily Amount: 3 Tabs. Start taking on:  5/20/2018 * oxyCODONE-acetaminophen 7.5-325 mg per tablet Commonly known as:  PERCOCET 7.5 Take 1 Tab by mouth three (3) times daily as needed for Pain for up to 30 days. Max Daily Amount: 3 Tabs. Start taking on:  6/19/2018 REFRESH LIQUIGEL 1 % Dlgl Generic drug:  carboxymethylcellulose sodium Apply 1 Drop to eye as needed. Indications: DRY EYE  
  
 * Notice: This list has 4 medication(s) that are the same as other medications prescribed for you. Read the directions carefully, and ask your doctor or other care provider to review them with you. Prescriptions Printed Refills  
 oxyCODONE-acetaminophen (PERCOCET 7.5) 7.5-325 mg per tablet 0 Starting on: 4/21/2018 Sig: Take 1 Tab by mouth three (3) times daily as needed for Pain for up to 30 days. Max Daily Amount: 3 Tabs. Class: Print Route: Oral  
 oxyCODONE-acetaminophen (PERCOCET 7.5) 7.5-325 mg per tablet 0 Starting on: 5/20/2018 Sig: Take 1 Tab by mouth three (3) times daily as needed for Pain for up to 30 days. Max Daily Amount: 3 Tabs. Class: Print Route: Oral  
 oxyCODONE-acetaminophen (PERCOCET 7.5) 7.5-325 mg per tablet 0 Starting on: 6/19/2018  Sig: Take 1 Tab by mouth three (3) times daily as needed for Pain for up to 30 days. Max Daily Amount: 3 Tabs. Class: Print Route: Oral  
  
Follow-up Instructions Return in about 3 months (around 7/2/2018). Patient Instructions 1. Continue current plan with no evidence of addiction or diversion. Stable on current medication without adverse events. 2. Refill oxycodone 7.5/325 mg 3 times daily as needed. 3. Continue home therapy for right hip 4. Schedule appointment with Dr. Marychuy Callahan to discuss possible left intertrochanteric hip injection 5. Continue to follow-up with orthopedic surgeon regarding right hip 6. Discussed risks of addiction, dependency, and opioid induced hyperalgesia. 7. Return to clinic in 3 months Introducing [facility] & HEALTH SERVICES! Dear Ganga Latif: Thank you for requesting a Shipwire account. Our records indicate that you already have an active Shipwire account. You can access your account anytime at [email or web address] Did you know that you can access your hospital and ER discharge instructions at any time in Shipwire? You can also review all of your test results from your hospital stay or ER visit. Additional Information If you have questions, please visit the Frequently Asked Questions section of the Shipwire website at [email or web address] Remember, Shipwire is NOT to be used for urgent needs. For medical emergencies, dial 911. Now available from your iPhone and Android! Please provide this summary of care documentation to your next provider. Your primary care clinician is listed as Toshia Nuñez. If you have any questions after today's visit, please call [phone].

## 2018-04-02 NOTE — PATIENT INSTRUCTIONS
1. Continue current plan with no evidence of addiction or diversion. Stable on current medication without adverse events. 2. Refill oxycodone 7.5/325 mg 3 times daily as needed. 3. Continue home therapy for right hip  4. Schedule appointment with Dr. Sheree Figueroa to discuss possible left intertrochanteric hip injection  5. Continue to follow-up with orthopedic surgeon regarding right hip   6. Discussed risks of addiction, dependency, and opioid induced hyperalgesia.    7. Return to clinic in 3 months

## 2018-04-05 ENCOUNTER — OFFICE VISIT (OUTPATIENT)
Dept: PAIN MANAGEMENT | Age: 83
End: 2018-04-05

## 2018-04-05 VITALS
WEIGHT: 163 LBS | HEART RATE: 62 BPM | TEMPERATURE: 96.6 F | DIASTOLIC BLOOD PRESSURE: 50 MMHG | BODY MASS INDEX: 30.78 KG/M2 | SYSTOLIC BLOOD PRESSURE: 135 MMHG | RESPIRATION RATE: 14 BRPM | HEIGHT: 61 IN

## 2018-04-05 DIAGNOSIS — M48.061 LUMBAR FORAMINAL STENOSIS: ICD-10-CM

## 2018-04-05 DIAGNOSIS — M47.816 LUMBAR FACET ARTHROPATHY: ICD-10-CM

## 2018-04-05 DIAGNOSIS — M51.36 DDD (DEGENERATIVE DISC DISEASE), LUMBAR: ICD-10-CM

## 2018-04-05 DIAGNOSIS — M51.26 LUMBAR DISC HERNIATION: ICD-10-CM

## 2018-04-05 DIAGNOSIS — Z98.890 S/P LUMBAR LAMINECTOMY: ICD-10-CM

## 2018-04-05 DIAGNOSIS — M16.9 OSTEOARTHRITIS OF HIP, UNSPECIFIED LATERALITY, UNSPECIFIED OSTEOARTHRITIS TYPE: Primary | ICD-10-CM

## 2018-04-05 DIAGNOSIS — M25.559 ARTHRALGIA OF HIP, UNSPECIFIED LATERALITY: ICD-10-CM

## 2018-04-05 NOTE — PROGRESS NOTES
[address] [facility]  Interventional Pain Management Consultation History & Physical    PATIENT NAME:  Ernesto Hooker     YOB: 1935    DATE OF SERVICE:   4/5/2018      CHIEF COMPLAINT:  Hip Pain (left) and Back Pain      REASON FOR VISIT:   Ernesto Hooker presents to the pain clinic today for initial evaluation and to consider interventional pain management options as indicated for the type and location of the pain the patient is presenting with. HISTORY OF PRESENT ILLNESS:    Patient presents for initial evaluation and consideration for interventional pain procedures as indicated. She is referred to us by JULIETTE Capps for evaluation and management of her bilateral hip pain. At today's evaluation patient endorses chronic left hip pain of many years duration. She states her pain is becoming worse. She endorses burning left hip groin pain, aching throbbing pain of the left hip area. Pain is worse especially in the morning. She wakes up at night with her left hip and pain. Pain radiates throughout the left hip area, left thigh left groin. Her pain is becoming more constant and aching. Pain is increased with axial loading including prolonged standing and walking. She has taken medications which just temporize her pain. She has not had previous left hip surgery. She does not take blood thinners. Review of available medical records, progress note dated April 2, 2018 by JULIETTE Perez is reviewed. Chronic low back pain as well as bilateral knee pain, left hip pain. She has had left total knee arthrotomy plasty November 2014. Right knee total hip arthroplasty April 17, 2017. Right hip is improving, but she has persistent left hip pain. Medications include opioid and non-opioid pain management medications, these temporize her pain. She has had 2 lumbar laminectomies ×2.      She has not had any recent physical therapy for her left hip pain. ASSESSMENT/OPTIONS: as follows. We discussed options. Patient has left hip pain similar to her previous right hip pain for which she eventually had right hip total arthroplasty. Pain is increased with axial loading. Medications offering some benefit. I will get an updated left hip x-ray series, and I will schedule her for left hip injection possibly ultrasound-guided. We will manage her ongoing left hip pain according to results with ultrasound-guided hip injection. She may be a candidate for left hip radiofrequency neurotomy procedures. I have discussed the risks and benefits, indications, contraindications, and side effects of intended procedure with the patient. I have used skeleton spine model to describe and discuss the procedure with the patient. I have answered all questions relating to the procedure. Patient understands the nature of the procedure and wishes to proceed. Patient has no further questions. MRI Results (most recent):    Results from Hospital Encounter encounter on 02/28/11   MRI [facility]   Narrative Ordering MD: Suhas Scott MD  Interpreted By: Dayna Mendoza III, MD  ** FINAL **  ---------------------------------------------------------------------  Procedure Date:  Feb 28 2011 10:50AM    Accession Number:  [id number]  Order No: [id number]  Procedure: MRB - L SPINE W/O CONTRAST  CPT Code: 41784  Reason: SPINAL STENOSIS  INTERPRETATION:  MRI lumbar spine  History: Progressive low back pain with right lower leg and foot   numbness. Comparison MRI 4/29/09  Technique: Sagittal and axial T1 weighted, sagittal STIR and   sagittal, axial and coronal T2 weighted FSE scans were obtained. Postcontrast scans cannot be obtained due to inadequate venous   access. Findings: There is T11-T12 disc space narrowing with right   paracentral disc protrusion only imaged on sagittal sequence.   The T12-L1 disc is normal. Small high T2 signal foci in the foramina   compatible with perineural arachnoid cysts. There is a small central disc protrusion at L1-L2. There is a high   T2 signal focus in the right L1-L2 foramen most compatible with   perineural arachnoid cyst.  There is mild bulging of disc annulus at L2-L3. There is grade 1 spondylolisthesis at L3-L4 with severe facet   arthropathy. There is a right paracentral and foraminal disc   protrusion L3-L4. There is moderate left and severe right foraminal   stenosis at L3-L4 with right L3 exiting nerve root impingement. There is grade 1 spondylolisthesis of L4-L5 due to severe facet   arthropathy with right-sided facet fusion. Diffuse bulging disc   annulus at L4-L5 and mild right foraminal stenosis. There is L5-S1 disc space narrowing and spondylosis. Moderate facet   arthropathy L5-S1. There is mild diffuse bulging of disc annulus at   L5-S1 with mild moderate bilateral foraminal stenosis. This right   upper L5 lateral recess stenosis. Laminectomies have been performed at L2-L5. The tip of the conus   medullaris is at L1. IMPRESSION:  1. Interval L2-L5 laminectomies since prior study. 2. Right paracentral disc protrusion at T11-T12 only imaged on   sagittal sequence and grossly unchanged. 2. Small central disc protrusion at L1-L2 without change. 3. Mild bulging disc annulus at L2-L3 without change. Interval   resolution of central stenosis status post decompression laminectomy. 4. Interval development of grade 1 spondylolisthesis of L3-L4. Severe facet arthropathy L3-L4 again evident. Interval resolution of   central stenosis following decompression laminectomy. Interval   development of moderate left and severe right foraminal stenosis at   L3-L4. Right paracentral and foraminal disc protrusion at L3-L4   developing since prior study. 5. Interval slight worsening of grade 1 spondylolisthesis at L4-L5.    Interval resolution of central canal stenosis following decompression laminectomy. Facet arthropathy with right L4-L5 facet   fusion. Mild right L4-L5 foraminal stenosis without change. 6. L5-S1 disc space narrowing and spondylosis and facet arthropathy   without significant change. Mild to moderate bilateral foraminal   stenosis without change. Right upper lateral recess stenosis without   significant change. PAST MEDICAL HISTORY:   The patient  has a past medical history of Allergic rhinitis; Chronic pain; Degenerative disk disease; Degenerative joint disease; Degenerative spine disease; Depression; Dyslipidemia; Hypertension; Lumbar laminectomy; Osteoporosis; Peptic ulcer disease; Peripheral neuropathy; Prediabetes; Pulmonary embolus; Scoliosis; Shoulder fracture (2013); Sleep apnea; Toenail fungus; and Vitamin B12 deficiency. PAST SURGICAL HISTORY:   The patient  has a past surgical history that includes hx partial hysterectomy; hx lumbar laminectomy; hx colonoscopy; hx gyn; hx total abdominal hysterectomy; pr breast surgery procedure unlisted (Bilateral); hx knee arthroscopy; hx knee replacement; hx back surgery; and hx knee replacement (Right, 12/2016). CURRENT MEDICATIONS:   The patient has a current medication list which includes the following prescription(s): ezetimibe, oxycodone-acetaminophen, atorvastatin, aspirin delayed-release, chlorthalidone, cholecalciferol, omeprazole, gabapentin, carboxymethylcellulose sodium, ferrous sulfate, and calcium-vitamin d.     ALLERGIES:     Allergies   Allergen Reactions    Forteo [Teriparatide] Other (comments) and Nausea and Vomiting     abd pains  abd pains    Lipitor [Atorvastatin] Other (comments) and Nausea and Vomiting     myalgias  myalgias    Livalo [Pitavastatin] Other (comments) and Nausea and Vomiting     myalgias  myalgias    Seafood Hives    Sulfa (Sulfonamide Antibiotics) Other (comments) and Hives     hives    Sulfur Other (comments)     rash    Zocor [Simvastatin] Other (comments) and Nausea and Vomiting     myalgias  myalgias       FAMILY HISTORY:   The patient family history includes Breast Cancer (age of onset: 16) in her daughter; Cancer in her maternal aunt; Heart Disease in her father; Hypertension in her mother; Stroke in her mother. SOCIAL HISTORY:   The patient  reports that she quit smoking about 26 years ago. Her smoking use included Cigarettes. She has a 2.50 pack-year smoking history. She has never used smokeless tobacco. The patient  reports that she does not drink alcohol. She      REVIEW OF SYSTEMS:    The patient denies fever, chills, weight loss (Constitutional), rash, itching (Skin), tinnitus, congestion (HENT), blurred vision, photophobia (Eyes), palpitations, orthopnea (Cardiovascular), hemoptysis, wheezing (Respiratory), nausea, vomiting, diarrhea (Gastrointestinal), dysuria, hematuria, urgency (Genitourinary), bowel or bladder incontinence, loss of consciousness (Neurologic), suicidal or homicidal ideation or hallucinations (Psychiatric). Denies swelling, axillary or groin masses (Lymphatic). PHYSICAL EXAM:  VS:   Visit Vitals    /50 (BP 1 Location: Left arm, BP Patient Position: Sitting)    Pulse 62    Temp 96.6 °F (35.9 °C) (Oral)    Resp 14    Ht 5' 1\" (1.549 m)    Wt 73.9 kg (163 lb)    BMI 30.8 kg/m2     General: Well-developed and well-nourished. Body habitus consistent with recorded height and weight and the calculated BMI. Apparent distress due to left hip pain, low back pain. Head: Normocephalic, atraumatic. Skin: Inspection of the skin reveals no rashes, lesions or infection. CV: Regular rate. No murmurs or rubs noted. No peripheral edema noted. Pulm: Respirations are even and unlabored. Extr: No clubbing, cyanosis, or edema noted. Musculoskeletal:  1. Cervical spine - Full ROM. No paraspinous tenderness at any level. There is no scoliosis, asymmetry, or musculoskeletal defect. 2. Thoracic spine - Full ROM.   No paraspinous tenderness at any level. There is no scoliosis, asymmetry, or musculoskeletal defect. 3. Lumbar spine -slightly decreased ROM. Paraspinous tenderness. SI joints are nontender bilaterally. There is no scoliosis, asymmetry, or musculoskeletal defect. 4. Right upper extremity - Full ROM. 5/5 muscle strength in all muscle groups. No pain or tenderness in shoulder, elbow, wrist, or hand. 5. Left upper extremity - Full ROM. 5/5 muscle strength in all muscle groups. No pain or tenderness in shoulder, elbow, wrist, or hand. 6. Right lower extremity - Full ROM. 5/5 muscle strength in all muscle groups. No pain, tenderness, or swelling in the hip, knee, ankle or foot. 7. Left lower extremity - Full ROM. 5/5 muscle strength in all muscle groups. No pain, tenderness, or swelling in the knee, ankle or foot. Left hip tender to palpation throughout the anterolateral posterior aspects of the left hip decreased range of motion at least in part due to guarding and tenderness with active and passive range of motion of the left hip. Crepitus noted with range of motion movement. Antalgic gait. Neurological:  1. Mental Status - Alert, awake and oriented. Speech is clear and appropriate. 2. Cranial Nerves - Extraocular muscles intact bilaterally. Cranial nerves II-XII grossly intact bilaterally. 3. Gait - antalgic   4. Reflexes - 2+ and symmetric throughout. 5. Sensation - Intact to light touch and pin prick. 6. Provocative Tests - Straight leg raise negative bilaterally. Psychological:  1. Mood and affect - Appropriate. 2. Speech - Appropriate. 3. Though content - Appropriate. 4. Judgment - Appropriate. ASSESSMENT:      ICD-10-CM ICD-9-CM    1. Osteoarthritis of hip, unspecified laterality, unspecified osteoarthritis type M16.9 715.95 XR HIP LT W OR WO PELV 2-3 VWS   2.  Arthralgia of hip, unspecified laterality M25.559 719.45 XR HIP LT W OR WO PELV 2-3 VWS   3. [facility] M46.96 721.3 XR HIP LT W OR WO PELV 2-3 VWS   4. Lumbar disc herniation M51.26 722.10 XR HIP LT W OR WO PELV 2-3 VWS   5. Lumbar foraminal stenosis M99.83 724.02 XR HIP LT W OR WO PELV 2-3 VWS   6. S/P lumbar laminectomy Z98.890 V45.89 XR HIP LT W OR WO PELV 2-3 VWS   7. DDD (degenerative disc disease), lumbar M51.36 722. 52 XR HIP LT W OR WO PELV 2-3 VWS           PLAN:    1.    I have thoroughly discussed the risks and benefits, indications, contraindications, and side effects of any and procedures that were mentioned at today's patient visit. I have used a skeleton model to explain all procedures, as well as to provide added emphasis regarding procedures and as well for patient education purposes. I have answered all questions in great detail, and I have obtained verbal confirmation for all procedures planned with the patient. 3.    I have reviewed in great detail today, when indicated, the patient's MRI and other imaging studies with the patient. I have explained to the patient, when indicated, their condition using both actual recent and relevant images insofar as I am able to obtain actual images. I have used a skeleton model for added emphasis as well as patient education. 4.    I have advised patient to have a primary care provider continue to care for their health maintenance and general medical conditions. 5,    I have placed appropriate referrals to specialty care providers as I have deemed necessary through today's clinical consultation with the patient. 5.    I have explained to the patient that if any significant side effects, issues, problems, concerns, or perceived complications may arise at around the time of the patient's procedures, they should either call the pain management clinic or go to the emergency room immediately for medical provider evaluation.    6.   I have encouraged all patients to call the pain management clinic with any questions or concerns that they may have pertaining to their procedures. DISPOSITION:   The patients condition and plan were discussed at length and all questions were answered. The patient agrees with the plan. A total of 40 minutes was spent with the patient of which over half of the time was spent counseling the patient. Alis Garcia MD 4/5/2018 6:07 PM    Note: Although these clinic notes were documented by the provider at the time of the exam, they have not been proofed and are subject to transcription variance.

## 2018-04-05 NOTE — PROGRESS NOTES
The pt is here today to discuss procedure options with Dr. Luis Frey. Patient rates pain score as 2/10.

## 2018-04-26 ENCOUNTER — TELEPHONE (OUTPATIENT)
Dept: PAIN MANAGEMENT | Age: 83
End: 2018-04-26

## 2018-04-30 RX ORDER — DIAZEPAM 5 MG/1
10 TABLET ORAL ONCE
Status: CANCELLED | OUTPATIENT
Start: 2018-05-02 | End: 2018-05-02

## 2018-04-30 RX ORDER — SODIUM CHLORIDE 0.9 % (FLUSH) 0.9 %
5-10 SYRINGE (ML) INJECTION AS NEEDED
Status: CANCELLED | OUTPATIENT
Start: 2018-04-30

## 2018-06-28 ENCOUNTER — OFFICE VISIT (OUTPATIENT)
Dept: PAIN MANAGEMENT | Age: 83
End: 2018-06-28

## 2018-06-28 VITALS
HEIGHT: 61 IN | BODY MASS INDEX: 30.02 KG/M2 | DIASTOLIC BLOOD PRESSURE: 68 MMHG | TEMPERATURE: 97.1 F | SYSTOLIC BLOOD PRESSURE: 144 MMHG | HEART RATE: 68 BPM | WEIGHT: 159 LBS | RESPIRATION RATE: 12 BRPM

## 2018-06-28 DIAGNOSIS — M25.551 BILATERAL HIP PAIN: ICD-10-CM

## 2018-06-28 DIAGNOSIS — M25.552 BILATERAL HIP PAIN: ICD-10-CM

## 2018-06-28 DIAGNOSIS — M54.42 CHRONIC MIDLINE LOW BACK PAIN WITH BILATERAL SCIATICA: ICD-10-CM

## 2018-06-28 DIAGNOSIS — G89.29 CHRONIC MIDLINE LOW BACK PAIN WITH BILATERAL SCIATICA: ICD-10-CM

## 2018-06-28 DIAGNOSIS — M54.41 CHRONIC MIDLINE LOW BACK PAIN WITH BILATERAL SCIATICA: ICD-10-CM

## 2018-06-28 DIAGNOSIS — G89.4 CHRONIC PAIN DISORDER: ICD-10-CM

## 2018-06-28 DIAGNOSIS — Z79.899 ENCOUNTER FOR LONG-TERM (CURRENT) USE OF HIGH-RISK MEDICATION: ICD-10-CM

## 2018-06-28 LAB
ALCOHOL UR POC: NORMAL
AMPHETAMINES UR POC: NEGATIVE
BARBITURATES UR POC: NORMAL
BENZODIAZEPINES UR POC: NEGATIVE
BUPRENORPHINE UR POC: NEGATIVE
CANNABINOIDS UR POC: NEGATIVE
CARISOPRODOL UR POC: NORMAL
COCAINE UR POC: NEGATIVE
FENTANYL UR POC: NORMAL
MDMA/ECSTASY UR POC: NORMAL
METHADONE UR POC: NEGATIVE
METHAMPHETAMINE UR POC: NORMAL
METHYLPHENIDATE UR POC: NORMAL
OPIATES UR POC: NEGATIVE
OXYCODONE UR POC: NORMAL
PHENCYCLIDINE UR POC: NORMAL
PROPOXYPHENE UR POC: NORMAL
TRAMADOL UR POC: NORMAL
TRICYCLICS UR POC: NORMAL

## 2018-06-28 RX ORDER — OXYCODONE AND ACETAMINOPHEN 7.5; 325 MG/1; MG/1
1 TABLET ORAL
Qty: 90 TAB | Refills: 0 | Status: SHIPPED | OUTPATIENT
Start: 2018-08-26 | End: 2018-09-11 | Stop reason: SDUPTHER

## 2018-06-28 RX ORDER — OXYCODONE AND ACETAMINOPHEN 7.5; 325 MG/1; MG/1
1 TABLET ORAL
Qty: 90 TAB | Refills: 0 | Status: SHIPPED | OUTPATIENT
Start: 2018-07-27 | End: 2018-09-11 | Stop reason: SDUPTHER

## 2018-06-28 RX ORDER — OXYCODONE AND ACETAMINOPHEN 7.5; 325 MG/1; MG/1
1 TABLET ORAL
Qty: 90 TAB | Refills: 0 | Status: SHIPPED | OUTPATIENT
Start: 2018-06-28 | End: 2018-09-11 | Stop reason: SDUPTHER

## 2018-06-28 NOTE — PATIENT INSTRUCTIONS
1. Continue current plan with no evidence of addiction or diversion. Stable on current medication without adverse events. 2. Refill oxycodone 7.5/325 mg 3 times daily as needed. 3. Continue home therapy for right hip  4. Plan to Schedule appointment with Dr. Beti Hughes to discuss possible left intertrochanteric hip injection later when needed. 5. Order H wave therapy  6. Continue to follow-up with orthopedic surgeon regarding right hip   7. Discussed risks of addiction, dependency, and opioid induced hyperalgesia. Please remember to call at least 4-5 business days prior to your medication refill. Return to clinic in 3 months. Please call and cancel your appointment and pain management agreement if you do decide to transfer your care.

## 2018-06-28 NOTE — PROGRESS NOTES
Nursing Notes    Patient presents to the office today in follow-up. Patient rates her pain at 8/10 on the numerical pain scale. Reviewed medications with counts as follows:    Rx Date filled Qty Dispensed Pill Count Last Dose Short   Percocet 7.5/325 mg  05/27/18 90 23 yesterday no                                  POC UDS was performed in office today. Any new labs or imaging since last appointment? YES. Pt had some labs done with her colonoscopy. She also had a CT scan done of her hip    Have you been to an emergency room (ER) or urgent care clinic since your last visit? NO            Have you been hospitalized since your last visit? NO     If yes, where, when, and reason for visit? Pt had a colonoscopy done out pt    Have you seen or consulted any other health care providers outside of the [address] [place] [address]  since your last visit? YES     If yes, where, when, and reason for visit? GI and pcp    Ms. Beau Marcial has a reminder for a \"due or due soon\" health maintenance. I have asked that she contact her primary care provider for follow-up on this health maintenance.     PHQ over the last two weeks 6/28/2018   Little interest or pleasure in doing things Not at all   Feeling down, depressed or hopeless Not at all   Total Score PHQ 2 0

## 2018-06-28 NOTE — PROGRESS NOTES
HISTORY OF PRESENT ILLNESS  Thu Hill is a 82 y.o. female    HPI: Ms. Diane Byrne  returns today for f/u of chronic low back with multilevel lumbar degenerative changes. Also has c/o leg and right knee pain. 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 Janell Dakin. Left TKA in November of 2014 by Dr. Elizabeth Burleson. Ms. Diane Byrne reports some improvement since her last visit. She has been doing well with her current treatment plan which continues to offer moderate pain control. We did discuss options at length today. We have discussed scheduling repeat hip injections with Dr. Yevgeniy Pantoja for her left hip, however she says that her hip is not bothering her as bad currently. She will plan to schedule injections when needed. We did discuss trying H wave. She says that she never received the order. This will be ordered again for her today. She is otherwise doing well with no other complaints today. We did have a lengthy conversation about changes to our practice. Explained to her that moving forward we will be focusing on more conservative and non-opioid plan of care. This will result in changes to her current treatment plan. We will continue with her Percocet with no changes today but she understands we will begin tapering her medication next visit. I will have her follow-up in 3 months for further evaluation and recommendation. Current medication management consists of Percocet 7.5/325 TID PRN  Medications are helping with pain control and quality of life. Her pain is 6-7/10 with medication and 8/10 without. Pt describes pain as aching and throbbing. Aggravating factors include standing, bending, and walking. Relieved with rest, medication, and avoiding painful activities. Current treatment is helping to improve general activity, mood, walking, sleep, enjoyment of life    Ms. Diane Byrne is tolerating medications well, with no side effects noted.  She is able to stay more active with less discomfort with these current doses. In the past 30 days, the patient reports an average of 20% pain relief with current treatment/medications. She is informed of side effects, risks, and benefits of this regimen, and emphasizes that she derives a significant improvement in functionality and quality of life, and notes that non-opioid medications and therapies in the past have not offered significant benefit. She  is otherwise doing well with no other complaints today. She denies any adverse events including nausea, vomiting, dizziness, increased constipation, hallucinations, or seizures.      MME: 33.8  COMM: 4  OSWESTRY: 58 %  Last UDS reviewed         Allergies   Allergen Reactions    Statins-Hmg-Coa Reductase Inhibitors Hives    Ezetimibe Hives    Forteo [Teriparatide] Other (comments) and Nausea and Vomiting     abd pains  abd pains    Lipitor [Atorvastatin] Other (comments) and Nausea and Vomiting     myalgias  myalgias    Livalo [Pitavastatin] Other (comments) and Nausea and Vomiting     myalgias  myalgias    Seafood Hives    Sulfa (Sulfonamide Antibiotics) Other (comments), Hives and Unknown (comments)     hives    Sulfur Other (comments)     rash    Zocor [Simvastatin] Other (comments) and Nausea and Vomiting     myalgias  myalgias       Past Surgical History:   Procedure Laterality Date    BREAST SURGERY PROCEDURE UNLISTED Bilateral     x2  breast biopsy, benign fibroadenoma    COLONOSCOPY N/A 4/19/2018    COLONOSCOPY, DIAGNOSTIC performed by Bhavin Araujo MD at [address] [place] [address] HX COLONOSCOPY      2009  repeat 2019    HX GYN      HX KNEE ARTHROSCOPY      Right knee surgery    HX KNEE REPLACEMENT      2014 left    HX KNEE REPLACEMENT Right 12/2016    HX LUMBAR LAMINECTOMY      x2    HX PARTIAL HYSTERECTOMY      partial    HX TOTAL ABDOMINAL HYSTERECTOMY           Review of Systems   Constitutional: Negative for chills, fever and weight loss. HENT: Negative for congestion and sore throat. Eyes: Negative for blurred vision and double vision. Respiratory: Negative for cough, shortness of breath and wheezing. Cardiovascular: Negative for chest pain and palpitations. Gastrointestinal: Negative for constipation, diarrhea, heartburn, nausea and vomiting. Genitourinary: Negative. Neurological: Negative for dizziness, seizures and loss of consciousness. Endo/Heme/Allergies: Does not bruise/bleed easily. Psychiatric/Behavioral: Negative for depression. The patient is not nervous/anxious and does not have insomnia. Physical Exam   Constitutional: She is oriented to person, place, and time and well-developed, well-nourished, and in no distress. No distress. HENT:   Head: Normocephalic and atraumatic. Eyes: EOM are normal.   Pulmonary/Chest: Effort normal.   Musculoskeletal:        Right knee: Tenderness found. Lumbar back: She exhibits decreased range of motion, tenderness and pain. Neurological: She is alert and oriented to person, place, and time. Gait ( ambulating with a cane) abnormal.   Skin: Skin is warm and dry. No rash noted. She is not diaphoretic. No erythema. Psychiatric: Mood, memory, affect and judgment normal.   Nursing note and vitals reviewed. ASSESSMENT:    1. Encounter for long-term (current) use of high-risk medication    2. Chronic pain disorder    3. Chronic midline low back pain with bilateral sciatica    4. Bilateral hip pain         [place] Prescription Monitoring Program was reviewed which does not demonstrate aberrancies and/or inconsistencies with regard to the historical prescribing of controlled medications to this patient by other providers. PLAN / Pt Instructions:  1. Continue current plan with no evidence of addiction or diversion. Stable on current medication without adverse events. 2. Refill oxycodone 7.5/325 mg 3 times daily as needed.   3. Continue home therapy for right hip  4. Plan to Schedule appointment with Dr. Gorge Carroll to discuss possible left intertrochanteric hip injection later when needed. 5. Order H wave therapy  6. Continue to follow-up with orthopedic surgeon regarding right hip   7. Discussed risks of addiction, dependency, and opioid induced hyperalgesia. Please remember to call at least 4-5 business days prior to your medication refill. Return to clinic in 3 months. Please call and cancel your appointment and pain management agreement if you do decide to transfer your care. Medications Ordered Today   Medications    oxyCODONE-acetaminophen (PERCOCET 7.5) 7.5-325 mg per tablet     Sig: Take 1 Tab by mouth three (3) times daily as needed for Pain for up to 30 days. Max Daily Amount: 3 Tabs. Dispense:  90 Tab     Refill:  0    oxyCODONE-acetaminophen (PERCOCET 7.5) 7.5-325 mg per tablet     Sig: Take 1 Tab by mouth three (3) times daily as needed for Pain for up to 30 days. Max Daily Amount: 3 Tabs. Dispense:  90 Tab     Refill:  0    oxyCODONE-acetaminophen (PERCOCET 7.5) 7.5-325 mg per tablet     Sig: Take 1 Tab by mouth three (3) times daily as needed for Pain for up to 30 days. Max Daily Amount: 3 Tabs. Dispense:  90 Tab     Refill:  0       DISPOSITION   Pain medications are prescribed with the objective of pain relief and improved physical and psychosocial function in this patient.  Patient has been counseled on proper use of prescribed medications.  Patient has been counseled about chronic medical conditions and their relationship to anxiety and depression and recommended mental health support as needed.  Reviewed with patient self-help tools, home exercise, and lifestyle changes to assist the patient in self-management of symptoms.    Reviewed with patient the treatment plan, goals of treatment plan, and limitations of treatment plan, to include the potential for side effects from medications and procedures. If side effects occur, it is the responsibility of the patient to inform the clinic so that a change in the treatment plan can be made in a safe manner. The patient is advised that stopping prescribed medication may cause an increase in symptoms and possible medication withdrawal symptoms. The patient is informed an emergency room evaluation may be necessary if this occurs. Spent 25 minutes with patient today which more than 50% of that time was spent on counseling and coordination of care. [employer], [id number] 6/28/2018      Note: Please excuse any typographical errors. Voice recognition software was used for this note and may cause mistakes.

## 2018-09-07 ENCOUNTER — TELEPHONE (OUTPATIENT)
Dept: PAIN MANAGEMENT | Age: 83
End: 2018-09-07

## 2018-09-07 NOTE — TELEPHONE ENCOUNTER
Request for medicine called in [id number]     1. Name, verified  2. Medicine requested - oxydodone  3.  [phone]  4. Analgesia - 80% pain relief  5. ADL - yes  6.   Adverse reaction to medicine - none

## 2018-09-11 ENCOUNTER — OFFICE VISIT (OUTPATIENT)
Dept: PAIN MANAGEMENT | Age: 83
End: 2018-09-11

## 2018-09-11 VITALS
DIASTOLIC BLOOD PRESSURE: 65 MMHG | SYSTOLIC BLOOD PRESSURE: 141 MMHG | TEMPERATURE: 97 F | HEIGHT: 61 IN | HEART RATE: 65 BPM | RESPIRATION RATE: 13 BRPM | WEIGHT: 159 LBS | BODY MASS INDEX: 30.02 KG/M2

## 2018-09-11 DIAGNOSIS — M51.36 DDD (DEGENERATIVE DISC DISEASE), LUMBAR: ICD-10-CM

## 2018-09-11 DIAGNOSIS — G89.29 CHRONIC MIDLINE LOW BACK PAIN WITH BILATERAL SCIATICA: ICD-10-CM

## 2018-09-11 DIAGNOSIS — M54.41 CHRONIC MIDLINE LOW BACK PAIN WITH BILATERAL SCIATICA: ICD-10-CM

## 2018-09-11 DIAGNOSIS — M54.42 CHRONIC MIDLINE LOW BACK PAIN WITH BILATERAL SCIATICA: ICD-10-CM

## 2018-09-11 DIAGNOSIS — M25.552 BILATERAL HIP PAIN: ICD-10-CM

## 2018-09-11 DIAGNOSIS — M48.061 LUMBAR FORAMINAL STENOSIS: ICD-10-CM

## 2018-09-11 DIAGNOSIS — M47.816 LUMBAR FACET ARTHROPATHY: ICD-10-CM

## 2018-09-11 DIAGNOSIS — M16.9 OSTEOARTHRITIS OF HIP, UNSPECIFIED LATERALITY, UNSPECIFIED OSTEOARTHRITIS TYPE: ICD-10-CM

## 2018-09-11 DIAGNOSIS — M15.9 PRIMARY OSTEOARTHRITIS INVOLVING MULTIPLE JOINTS: ICD-10-CM

## 2018-09-11 DIAGNOSIS — M25.551 BILATERAL HIP PAIN: ICD-10-CM

## 2018-09-11 DIAGNOSIS — G89.4 CHRONIC PAIN DISORDER: ICD-10-CM

## 2018-09-11 DIAGNOSIS — M51.26 LUMBAR DISC HERNIATION: ICD-10-CM

## 2018-09-11 RX ORDER — ENOXAPARIN SODIUM 100 MG/ML
30 INJECTION SUBCUTANEOUS DAILY
COMMUNITY
End: 2018-12-10 | Stop reason: ALTCHOICE

## 2018-09-11 RX ORDER — OXYCODONE AND ACETAMINOPHEN 7.5; 325 MG/1; MG/1
1 TABLET ORAL
Qty: 85 TAB | Refills: 0 | Status: SHIPPED | OUTPATIENT
Start: 2018-10-10 | End: 2018-11-09

## 2018-09-11 RX ORDER — OXYCODONE AND ACETAMINOPHEN 7.5; 325 MG/1; MG/1
1 TABLET ORAL
Qty: 90 TAB | Refills: 0 | Status: SHIPPED | OUTPATIENT
Start: 2018-09-11 | End: 2018-10-11

## 2018-09-11 RX ORDER — OXYCODONE AND ACETAMINOPHEN 7.5; 325 MG/1; MG/1
1 TABLET ORAL
Qty: 80 TAB | Refills: 0 | Status: SHIPPED | OUTPATIENT
Start: 2018-11-09 | End: 2018-12-10 | Stop reason: SDUPTHER

## 2018-09-11 NOTE — PROGRESS NOTES
Nursing Notes    Patient presents to the office today in follow-up. Patient rates her pain at 2/10 on the numerical pain scale. Reviewed medications with counts as follows:    Rx Date filled Qty Dispensed Pill Count Last Dose Short   Percocet 7.5/325 mg 08/03/18 90 11 yesterday no                                  POC UDS was not performed in office today    Any new labs or imaging since last appointment? YES.pt had some pre-op labs done. She also had an ultrasound done of her pelvic area. She did have a CT scan of abd/pelvis    Have you been to an emergency room (ER) or urgent care clinic since your last visit? NO            Have you been hospitalized since your last visit? YES     If yes, where, when, and reason for visit? Pt had an out pt procedure to have an ovarian cyst removed on her left side    Have you seen or consulted any other health care providers outside of the [facility]  since your last visit? YES     If yes, where, when, and reason for visit? Pcp, OB/GYN  Ms. Anny Corado has a reminder for a \"due or due soon\" health maintenance. I have asked that she contact her primary care provider for follow-up on this health maintenance.     PHQ over the last two weeks 9/11/2018   Little interest or pleasure in doing things Not at all   Feeling down, depressed, irritable, or hopeless Not at all   Total Score PHQ 2 0     \

## 2018-09-11 NOTE — PROGRESS NOTES
HISTORY OF PRESENT ILLNESS  Radha Leslie is a 82 y.o. female    HPI: Ms. Cristian Hernandez  returns today for f/u of chronic low back with multilevel lumbar degenerative changes. Also has c/o leg and right knee pain. 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 Carlos A Yin. Left TKA in November of 2014 by Dr. Devonte Davey. Ms. Cristian Hernandez continues unchanged as last visit. She does report that she recently underwent surgery due to a recent cyst found on her ovary. She reports that surgery went well and cyst was benign. She did not receive any postsurgical medications and has been doing well with her current treatment plan which continues to offer moderate pain control. We did order H wave last visit. She says that she has not heard anything about her H wave yet. I will put in a new order today. She is otherwise doing well with no new complaints. We will begin tapering her Percocet as previously discussed. Please see tapering plan below. I will have her follow-up in 3 months for further evaluation and recommendation or sooner if needed. Current medication management consists of Percocet 7.5/325 TID PRN  Medications are helping with pain control and quality of life. Her pain is 6-7/10 with medication and 8/10 without. Pt describes pain as aching and throbbing. Aggravating factors include standing, bending, and walking. Relieved with rest, medication, and avoiding painful activities. Current treatment is helping to improve general activity, mood, walking, sleep, enjoyment of life    Ms. Cristian Hernandez is tolerating medications well, with no side effects noted. She is able to stay more active with less discomfort with these current doses. In the past 30 days, the patient reports an average of 20% pain relief with current treatment/medications.    She is informed of side effects, risks, and benefits of this regimen, and emphasizes that she derives a significant improvement in functionality and quality of life, and notes that non-opioid medications and therapies in the past have not offered significant benefit. She  is otherwise doing well with no other complaints today. She denies any adverse events including nausea, vomiting, dizziness, increased constipation, hallucinations, or seizures. MME: 33.8  COMM: 4  OSWESTRY: 58 %  Last UDS reviewed         Allergies   Allergen Reactions    Statins-Hmg-Coa Reductase Inhibitors Hives    Ezetimibe Hives    Forteo [Teriparatide] Other (comments) and Nausea and Vomiting     abd pains  abd pains    Lipitor [Atorvastatin] Other (comments) and Nausea and Vomiting     myalgias  myalgias    Livalo [Pitavastatin] Other (comments) and Nausea and Vomiting     myalgias  myalgias    Seafood Hives    Sulfa (Sulfonamide Antibiotics) Other (comments), Hives and Unknown (comments)     hives    Sulfur Other (comments)     rash    Zocor [Simvastatin] Other (comments) and Nausea and Vomiting     myalgias  myalgias       Past Surgical History:   Procedure Laterality Date    BREAST SURGERY PROCEDURE UNLISTED Bilateral     x2  breast biopsy, benign fibroadenoma    COLONOSCOPY N/A 4/19/2018    COLONOSCOPY, DIAGNOSTIC performed by Kassi Traylor MD at [facility] ENDOSCOPY    HX [facility] HX COLONOSCOPY      2009  repeat 2019    HX GYN      HX KNEE ARTHROSCOPY      Right knee surgery    HX KNEE REPLACEMENT      2014 left    HX KNEE REPLACEMENT Right 12/2016    HX LUMBAR LAMINECTOMY      x2    HX PARTIAL HYSTERECTOMY      partial    HX TOTAL ABDOMINAL HYSTERECTOMY           Review of Systems   Constitutional: Negative for chills, fever and weight loss. HENT: Negative for congestion and sore throat. Eyes: Negative for blurred vision and double vision. Respiratory: Negative for cough, shortness of breath and wheezing. Cardiovascular: Negative for chest pain and palpitations.    Gastrointestinal: Negative for constipation, diarrhea, heartburn, nausea and vomiting. Genitourinary: Negative. Neurological: Negative for dizziness, seizures and loss of consciousness. Endo/Heme/Allergies: Does not bruise/bleed easily. Psychiatric/Behavioral: Negative for depression. The patient is not nervous/anxious and does not have insomnia. Physical Exam   Constitutional: She is oriented to person, place, and time and well-developed, well-nourished, and in no distress. No distress. HENT:   Head: Normocephalic and atraumatic. Eyes: EOM are normal.   Pulmonary/Chest: Effort normal.   Musculoskeletal:        Right knee: Tenderness found. Lumbar back: She exhibits decreased range of motion, tenderness and pain. Neurological: She is alert and oriented to person, place, and time. Gait ( ambulating with a cane) abnormal.   Skin: Skin is warm and dry. No rash noted. She is not diaphoretic. No erythema. Psychiatric: Mood, memory, affect and judgment normal.   Nursing note and vitals reviewed. ASSESSMENT:    1. Chronic pain disorder    2. Chronic midline low back pain with bilateral sciatica    3. Bilateral hip pain    4. DDD (degenerative disc disease), lumbar    5. Lumbar disc herniation    6. Lumbar facet arthropathy (HCC)    7. Lumbar foraminal stenosis    8. Primary osteoarthritis involving multiple joints    9. Osteoarthritis of hip, unspecified laterality, unspecified osteoarthritis type         [place] Prescription Monitoring Program was reviewed which does not demonstrate aberrancies and/or inconsistencies with regard to the historical prescribing of controlled medications to this patient by other providers. PLAN / Pt Instructions:  1. Modify current plan with no evidence of addiction or diversion. Stable on current medication without adverse events.     2. Refill oxycodone 7.5/325 mg 3 times daily as needed ×1 month, then adjust down to 3 times daily as needed, no more than #85 per month ×1 month, then adjust down to #80 per month until next visit. 3. Continue home therapy for right hip  4. Plan to Schedule appointment with Dr. Luis Cazares to discuss possible left intertrochanteric hip injection later when needed. 5. Order H wave therapy  6. Continue to follow-up with orthopedic surgeon regarding right hip   7. Discussed risks of addiction, dependency, and opioid induced hyperalgesia. Please remember to call at least 5 business days prior to your medication refill. Return to clinic in 3 months. Please call and cancel your appointment and pain management agreement if you do decide to transfer your care. Medications Ordered Today   Medications    oxyCODONE-acetaminophen (PERCOCET 7.5) 7.5-325 mg per tablet     Sig: Take 1 Tab by mouth three (3) times daily as needed for Pain for up to 30 days. Max Daily Amount: 3 Tabs. Dispense:  90 Tab     Refill:  0    oxyCODONE-acetaminophen (PERCOCET 7.5) 7.5-325 mg per tablet     Sig: Take 1 Tab by mouth three (3) times daily as needed for Pain for up to 30 days. Max Daily Amount: 3 Tabs. No more than #85 per month     Dispense:  85 Tab     Refill:  0    oxyCODONE-acetaminophen (PERCOCET 7.5) 7.5-325 mg per tablet     Sig: Take 1 Tab by mouth three (3) times daily as needed for Pain for up to 30 days. Max Daily Amount: 3 Tabs. No more than #80 per month     Dispense:  80 Tab     Refill:  0       DISPOSITION   Pain medications are prescribed with the objective of pain relief and improved physical and psychosocial function in this patient.  Patient has been counseled on proper use of prescribed medications.  Patient has been counseled about chronic medical conditions and their relationship to anxiety and depression and recommended mental health support as needed.  Reviewed with patient self-help tools, home exercise, and lifestyle changes to assist the patient in self-management of symptoms.    Reviewed with patient the treatment plan, goals of treatment plan, and limitations of treatment plan, to include the potential for side effects from medications and procedures. If side effects occur, it is the responsibility of the patient to inform the clinic so that a change in the treatment plan can be made in a safe manner. The patient is advised that stopping prescribed medication may cause an increase in symptoms and possible medication withdrawal symptoms. The patient is informed an emergency room evaluation may be necessary if this occurs. Spent 25 minutes with patient today which more than 50% of that time was spent on counseling and coordination of care. Elsa Rios 9/11/2018      Note: Please excuse any typographical errors. Voice recognition software was used for this note and may cause mistakes.

## 2018-09-11 NOTE — PATIENT INSTRUCTIONS
1. Modify current plan with no evidence of addiction or diversion. Stable on current medication without adverse events. 2. Refill oxycodone 7.5/325 mg 3 times daily as needed ×1 month, then adjust down to 3 times daily as needed, no more than #85 per month ×1 month, then adjust down to #80 per month until next visit. 3. Continue home therapy for right hip  4. Plan to Schedule appointment with Dr. Carina Marino to discuss possible left intertrochanteric hip injection later when needed. 5. Order H wave therapy  6. Continue to follow-up with orthopedic surgeon regarding right hip   7. Discussed risks of addiction, dependency, and opioid induced hyperalgesia. Please remember to call at least 5 business days prior to your medication refill. Return to clinic in 3 months. Please call and cancel your appointment and pain management agreement if you do decide to transfer your care.

## 2018-10-09 ENCOUNTER — TELEPHONE (OUTPATIENT)
Dept: PAIN MANAGEMENT | Age: 83
End: 2018-10-09

## 2018-10-09 NOTE — TELEPHONE ENCOUNTER
Medicine refill called in [phone]  8:55am    1. Verified  2. Medicine - percocet  3.    4. Analgesia - 80%  5. ADL - yes  6.   Adverse reaction to medicine - none

## 2018-10-09 NOTE — TELEPHONE ENCOUNTER
Romulo Gil has called requesting a refill of their controlled medication, Percocet 7.5-325 mg tab, for the management of LBP. Last office visit date: 9/11/18 with Raymond Riley PA-C. Patient has f/u appt scheduled with Eric Salguero PA-C on 12/10/18. Date last  was pulled and reviewed : 10/9/18. Last fill date was 9/11/18. Was the patient compliant when the above report was pulled? yes    Analgesia: Per C.S. Patient states they receive 80% pain relief on current regimen. Aberrancies: None reported in the last 30 days. ADL's: Per C.S. Patient states they are able to perform ADL's on current regimen. Adverse Reaction: Per C.S. Patient reports no adverse reactions at this time. Provider's last note and plan of care reviewed? yes  Request forwarded to provider for review. Prescription pre-printed by provider, Raymond Riley PA-C. D/T provider no longer here, I will route request to practice medical director, Lucrecia Hernandez for review.

## 2018-10-10 NOTE — TELEPHONE ENCOUNTER
Verified that patient has current OCA. Called patient, Patient identified using two patient identifiers (name and ). I informed patient that prescription was ready for . Patient verbalized understanding.

## 2018-10-10 NOTE — TELEPHONE ENCOUNTER
Reviewed, ok to distribute prescriptions. Reviewed. OK to distribute prescription. Ensure pt has current OCA.

## 2018-11-15 ENCOUNTER — TELEPHONE (OUTPATIENT)
Dept: PAIN MANAGEMENT | Age: 83
End: 2018-11-15

## 2018-11-15 DIAGNOSIS — Z79.899 HIGH RISK MEDICATION USE: Primary | ICD-10-CM

## 2018-11-15 RX ORDER — NALOXONE HYDROCHLORIDE 4 MG/.1ML
SPRAY NASAL
Qty: 1 EACH | Refills: 0 | Status: SHIPPED | OUTPATIENT
Start: 2018-11-15

## 2018-11-15 NOTE — TELEPHONE ENCOUNTER
Saurav Weaver has called requesting a refill of their controlled medication, percocet, for the management of her chronic back and joint pain. Last office visit date: 09/11/18  Last opioid care agreement 01/02/18  Last UDS was done 06/28/18    Date last  was pulled and reviewed : 11/15/18, last fill date for percocet was 10/10/18    Was the patient compliant when the above report was pulled? yes    Analgesia: pt reports a 90% pain relief with her current opioid medication regimen    Aberrancies: none noted     ADL's: pt reports being able to perform her normal daily tasks because she is taking her medication. Adverse Reaction: none reported by the pt at this time. Provider's last note and plan of care reviewed? Yes, pre-printed prescription  Request forwarded to provider for review.

## 2018-11-16 NOTE — TELEPHONE ENCOUNTER
Attempted to contact the pt about her refill request. She was not able to be reached. A message was not left for the pt. There was no pt identification on the voicemail.

## 2018-11-16 NOTE — TELEPHONE ENCOUNTER
Pt was contacted about her refill request. The pt was identified using 2 pt identifiers. Pt was made aware of the narcan nasal spray prescription that was sent over to her pharmacy. The pt was educated on what the medication was and what it was used for. She was also informed that her prescription was done and ready for  at the office. The pt verbalized understanding and has no questions at this time.

## 2018-11-16 NOTE — TELEPHONE ENCOUNTER
Please inform patient that Narcan rescue spray has been provided to enhance her safety while she is on chronic opioid therapy. Reviewed. OK to distribute prescription.

## 2018-12-10 ENCOUNTER — OFFICE VISIT (OUTPATIENT)
Dept: PAIN MANAGEMENT | Age: 83
End: 2018-12-10

## 2018-12-10 VITALS
WEIGHT: 159 LBS | TEMPERATURE: 97.5 F | HEART RATE: 69 BPM | BODY MASS INDEX: 30.02 KG/M2 | SYSTOLIC BLOOD PRESSURE: 134 MMHG | RESPIRATION RATE: 14 BRPM | HEIGHT: 61 IN | OXYGEN SATURATION: 98 % | DIASTOLIC BLOOD PRESSURE: 57 MMHG

## 2018-12-10 DIAGNOSIS — M51.36 DDD (DEGENERATIVE DISC DISEASE), LUMBAR: ICD-10-CM

## 2018-12-10 DIAGNOSIS — M25.552 BILATERAL HIP PAIN: ICD-10-CM

## 2018-12-10 DIAGNOSIS — M47.816 LUMBAR FACET ARTHROPATHY: ICD-10-CM

## 2018-12-10 DIAGNOSIS — M54.42 CHRONIC MIDLINE LOW BACK PAIN WITH BILATERAL SCIATICA: ICD-10-CM

## 2018-12-10 DIAGNOSIS — M16.0 OSTEOARTHRITIS OF BOTH HIPS, UNSPECIFIED OSTEOARTHRITIS TYPE: ICD-10-CM

## 2018-12-10 DIAGNOSIS — M25.551 BILATERAL HIP PAIN: ICD-10-CM

## 2018-12-10 DIAGNOSIS — M15.9 PRIMARY OSTEOARTHRITIS INVOLVING MULTIPLE JOINTS: ICD-10-CM

## 2018-12-10 DIAGNOSIS — M54.41 CHRONIC MIDLINE LOW BACK PAIN WITH BILATERAL SCIATICA: ICD-10-CM

## 2018-12-10 DIAGNOSIS — G89.29 CHRONIC MIDLINE LOW BACK PAIN WITH BILATERAL SCIATICA: ICD-10-CM

## 2018-12-10 DIAGNOSIS — G89.4 CHRONIC PAIN SYNDROME: ICD-10-CM

## 2018-12-10 DIAGNOSIS — Z98.890 S/P LUMBAR LAMINECTOMY: ICD-10-CM

## 2018-12-10 DIAGNOSIS — Z79.899 ENCOUNTER FOR LONG-TERM (CURRENT) USE OF HIGH-RISK MEDICATION: Primary | ICD-10-CM

## 2018-12-10 RX ORDER — CAPSAICIN 0.1 %
CREAM (GRAM) TOPICAL
Qty: 60 G | Refills: 2 | Status: SHIPPED | OUTPATIENT
Start: 2018-12-10 | End: 2019-03-11

## 2018-12-10 RX ORDER — SPIRONOLACTONE 25 MG/1
25 TABLET ORAL DAILY
COMMUNITY

## 2018-12-10 RX ORDER — OXYCODONE AND ACETAMINOPHEN 7.5; 325 MG/1; MG/1
1 TABLET ORAL
Qty: 75 TAB | Refills: 0 | Status: SHIPPED | OUTPATIENT
Start: 2018-12-18 | End: 2019-01-16 | Stop reason: SDUPTHER

## 2018-12-10 NOTE — PROGRESS NOTES
Nursing Notes    Patient presents to the office today in follow-up. Patient rates her pain at 4/10 on the numerical pain scale. Reviewed medications with counts as follows:    Rx Date filled Qty Dispensed Pill Count Last Dose Short   Oxycodone 7.5-325mg 11/19/18 80 19 yesterday no                     Last opioid agreement 1/2/18/  Last urine drug screen 6/28/18  PHQ over the last two weeks 12/10/2018   Little interest or pleasure in doing things Not at all   Feeling down, depressed, irritable, or hopeless Not at all   Total Score PHQ 2 0       Comments:     POC UDS was performed in office today    Any new labs or imaging since last appointment? YES, MRI of head U/S of head    Have you been to an emergency room (ER) or urgent care clinic since your last visit? NO            Have you been hospitalized since your last visit? NO     If yes, where, when, and reason for visit? Have you seen or consulted any other health care providers outside of the [address] [place] [address]  since your last visit? YES     If yes, where, when, and reason for visit? Ms. Elli Michelle has a reminder for a \"due or due soon\" health maintenance. I have asked that she contact her primary care provider for follow-up on this health maintenance.

## 2018-12-10 NOTE — PROGRESS NOTES
Referral date 11/1/12, source Dr Luan Yang and for [facility], [facility]. and bilateral leg symptoms. HPI:  Noah Silva is a 82 y.o. female here for f/u visit for ongoing evaluation of chronic lower back pain; she is s/p lumbar laminectomy x2 (1971 & 2010). She also reports left hip pain. She is s/p right JUDITH (2017) and right TKA (2016). Pt was last seen here on 9/11/18. Pt denies interval changes on the character or distribution of pain. Pain is located lumbosacral midline region described as intermittent aching. She also reports intermittent stabbing in her left hip. Pain at its best is 2/10. Pain at its worse is 8/10. The pain is worsened by movement, prolonged standing and prolonged walking. Symptoms are improved by NSAID, oxycodone, TENS unit \"years ago\", \"sitting still\", lidocaine patches, heat/hot shower, aquatic PT and temporarily by steroid injections in the past. Her PCP took her off Gabapentin due to foot swelling. Pt has tried Lyrica, Tylenol and chiropractor with no perceived benefit. She has not tried acupuncture therapy, massage therapy, TCA or Topamax. She tried Cymbalta \"along time ago\" but unsure why it was stopped.  Since last visit, H-wave was ordered on 9/11/18 but she was unable to afford the $500 cost.      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     Comment: [employer]   Tobacco Use    Smoking status: Former Smoker     Packs/day: 0.50     Years: 5.00     Pack years: 2.50     Types: Cigarettes     Last attempt to quit: 1/1/1992 Years since quittin.9    Smokeless tobacco: Never Used   Substance and Sexual Activity    Alcohol use: No    Drug use: No    Sexual activity: Yes     Birth control/protection: Surgical     Comment: post menopausal   Other Topics Concern    Not on file   Social History Narrative    Not on file     Family History   Problem Relation Age of Onset    Hypertension Mother     Stroke Mother     Heart Disease Father     Breast Cancer Daughter 16    Cancer Maternal Aunt         leukemia     Allergies   Allergen Reactions    Statins-Hmg-Coa Reductase Inhibitors Hives    Ezetimibe Hives    Forteo [Teriparatide] Other (comments) and Nausea and Vomiting     abd pains  abd pains    Lipitor [Atorvastatin] Other (comments) and Nausea and Vomiting     myalgias  myalgias    Livalo [Pitavastatin] Other (comments) and Nausea and Vomiting     myalgias  myalgias    Seafood Hives    Sulfa (Sulfonamide Antibiotics) Other (comments), Hives and Unknown (comments)     hives    Sulfur Other (comments)     rash    Zocor [Simvastatin] Other (comments) and Nausea and Vomiting     myalgias  myalgias     Past Medical History:   Diagnosis Date    Allergic rhinitis     Chronic pain     Degenerative disk disease     Degenerative joint disease     Degenerative spine disease     Depression     nos, pt. denies    Dyslipidemia     intollerant of statins    Hypertension     Hypertension     Lumbar laminectomy     Osteoporosis     Dr. Buster Velasquez -Endocrine    Peptic ulcer disease     Peripheral neuropathy     Prediabetes     Pulmonary embolus     2009  bilateral - after back sx    Scoliosis     Shoulder fracture 2013    right (fell off curb)    Sleep apnea     no CPAP (has never used)    Toenail fungus     Vitamin B12 deficiency      Past Surgical History:   Procedure Laterality Date    BREAST SURGERY PROCEDURE UNLISTED Bilateral     x2  breast biopsy, benign fibroadenoma    COLONOSCOPY N/A 2018 COLONOSCOPY, DIAGNOSTIC performed by Rambo Barth MD at [address] [place] [address] Av HX COLONOSCOPY      2009  repeat 2019    HX GYN      HX KNEE ARTHROSCOPY      Right knee surgery    HX KNEE REPLACEMENT      2014 left    HX KNEE REPLACEMENT Right 12/2016    HX LUMBAR LAMINECTOMY      x2    HX PARTIAL HYSTERECTOMY      partial    HX TOTAL ABDOMINAL HYSTERECTOMY       Current Outpatient Medications on File Prior to Visit   Medication Sig    spironolactone (ALDACTONE) 25 mg tablet Take  by mouth daily.  naloxone (NARCAN) 4 mg/actuation nasal spray Use 1 spray intranasally, then discard. Repeat with new spray every 2 min as needed for opioid overdose symptoms, alternating nostrils.  hydrOXYzine pamoate (VISTARIL) 25 mg capsule Take 25 mg by mouth three (3) times daily as needed for Itching.  ergocalciferol (VITAMIN D2) 50,000 unit capsule Take 50,000 Units by mouth every seven (7) days.  amLODIPine (NORVASC) 5 mg tablet Take 5 mg by mouth daily.  hydroCHLOROthiazide (HYDRODIURIL) 25 mg tablet Take 25 mg by mouth daily.  aspirin delayed-release 81 mg tablet Take 1 Tab by mouth daily. Start 5/1/2017 (Patient taking differently: Take 162 mg by mouth daily. Start 5/1/2017)    chlorthalidone (HYGROTEN) 25 mg tablet Take 1 Tab by mouth daily. (Patient taking differently: Take 25 mg by mouth daily. Indications: Hypertension)    omeprazole (PRILOSEC) 20 mg capsule Take 20 mg by mouth daily.  carboxymethylcellulose sodium (REFRESH LIQUIGEL) 1 % dlgl Apply 1 Drop to eye as needed. Indications: DRY EYE     No current facility-administered medications on file prior to visit.          ROS:  Denies fever, chills, nausea, vomiting, diarrhea, constipation, abdominal pain, chest pain, shortness or breath/trouble breathing, weakness, trouble swallowing, changes in vision, changes in hearing, falls, dizziness, bladder incontinence, bowel incontinence, depression, anxiety, suicidal ideations, homicidal ideations or alcohol use. Opioid specific risk: hx DVT/PE, osteoporosis, hx constipation, sleep apnea, PUD, pre-DM. Vitals:    12/10/18 0800   BP: 134/57   Pulse: 69   Resp: 14   Temp: 97.5 °F (36.4 °C)   TempSrc: Oral   SpO2: 98%   Weight: 72.1 kg (159 lb)   Height: 5' 1\" (1.549 m)   PainSc:   4   PainLoc: Back        Imaging:  MRI Lumbar Spine WO Contrast 2/28/11  \"\"IMPRESSION:  1. Interval L2-L5 laminectomies since prior study. 2. Right paracentral disc protrusion at T11-T12 only imaged on   sagittal sequence and grossly unchanged. 2. Small central disc protrusion at L1-L2 without change. 3. Mild bulging disc annulus at L2-L3 without change. Interval   resolution of central stenosis status post decompression laminectomy. 4. Interval development of grade 1 spondylolisthesis of L3-L4. Severe facet arthropathy L3-L4 again evident. Interval resolution of central stenosis following decompression laminectomy. Interval development of moderate left and severe right foraminal stenosis at L3-L4. Right paracentral and foraminal disc protrusion at L3-L4 developing since prior study. 5. Interval slight worsening of grade 1 spondylolisthesis at L4-L5. Interval resolution of central canal stenosis following   decompression laminectomy. Facet arthropathy with right L4-L5 facet fusion. Mild right L4-L5 foraminal stenosis without change. 6. L5-S1 disc space narrowing and spondylosis and facet arthropathy without significant change. Mild to moderate bilateral foraminal stenosis without change. Right upper lateral recess stenosis without significant change. \"\"    Left hip xray 4/23/18  \"\"IMPRESSION:  1. Moderate osteophytic changes left hip. Recommend follow-up cross sectional imaging if patient cannot bear weight. 2. Right total hip. \"\"    Labs:   BUN/Cr: 12/0.7 on 4/19/17  AST/ALT: 18/17 on 4/4/17      Physical exam:  AFVSS, no acute distress, overweight body habitus. A&OXs 3. Normocephalic, atraumatic. Conjugate gaze, clear sclerae. Speech is clear and appropriate. Mood is appropriate and patient is cooperative. Gait and balance are within functional limits with use of monocane in right hand. Non-labored breathing. LE:   Strength for right lower extremity is 5/5 for hip flexion, knee extension, dorsiflexion, extensor hallucis longus, plantar flexion. Strength for left lower extremity is 5/5 for hip flexion, knee extension, dorsiflexion, extensor hallucis longus, plantar flexion. Sensation to light touch is intact for right L2-S2. Sensation to light touch is intact for left L2-S2. Negative ankle clonus on the right and the left. Negative seated straight leg raise bilaterally. Full AROM lumbar flexion without reproduction of primary pain. AROM lumbar extension decreased by 25% with reproduction of primary pain. TTP L? R greater trochanteric regions, midline lumbar region and bilateral SIJ region. Calculated MEQ - 33.8  Naloxone rescue - yes  Prophylactic bowel program - no  Date of last OCA today  Last UDS today, consistent POC, pending confirmatory testing   date checked today, findings consistent    Primary Care Physician  Byron Brown, [address] [facility] [address] [id number]  [phone]    Today  ORT - 0  PGIC - 6 & 3  ROXANA - 47%  COMM - 0    PHQ -- . PHQ over the last two weeks 12/10/2018   Little interest or pleasure in doing things Not at all   Feeling down, depressed, irritable, or hopeless Not at all   Total Score PHQ 2 0       DSM V-OUD Screen - defer to next visit     Assessment/Plan:     ICD-10-CM ICD-9-CM    1. Encounter for long-term (current) use of high-risk medication Z79.899 V58.69 DRUG SCREEN      AMB POC DRUG SCREEN ()   2. S/P lumbar laminectomy Z98.890 V45.89 REFERRAL TO PHYSICAL THERAPY      TENS Units (TENS 504) george      capsaicin (CAPZASIN-HP) 0.1 % topical cream   3.  DDD (degenerative disc disease), lumbar M51.36 722.52 REFERRAL TO PHYSICAL THERAPY      TENS Units (TENS 504) george      capsaicin (CAPZASIN-HP) 0.1 % topical cream   4. Bilateral hip pain M25.551 719.45 REFERRAL TO PHYSICAL THERAPY    M25.552  TENS Units (TENS 504) george      oxyCODONE-acetaminophen (PERCOCET 7.5) 7.5-325 mg per tablet      capsaicin (CAPZASIN-HP) 0.1 % topical cream   5. Chronic midline low back pain with bilateral sciatica M54.41 724.2 REFERRAL TO PHYSICAL THERAPY    M54.42 724.3 TENS Units (TENS 504) george    G89.29 338.29 oxyCODONE-acetaminophen (PERCOCET 7.5) 7.5-325 mg per tablet      capsaicin (CAPZASIN-HP) 0.1 % topical cream   6. Osteoarthritis of both hips, unspecified osteoarthritis type M16.0 715.95 REFERRAL TO PHYSICAL THERAPY      TENS Units (TENS 504) george      capsaicin (CAPZASIN-HP) 0.1 % topical cream   7. Chronic pain syndrome G89.4 338.4 REFERRAL TO PHYSICAL THERAPY      TENS Units (TENS 504) george      capsaicin (CAPZASIN-HP) 0.1 % topical cream   8. Lumbar facet arthropathy M47.816 721.3    9. Primary osteoarthritis involving multiple joints M15.0 715.09         Do not recommend long term opioid therapy for this patient at this time. Pt currently taking Norco 7.5/325mg up to 3 times a day as needed with a total of 80 tabs to be budgeted over 30 days. Their MME is 33.8. On average pt usually takes one tablet per day. Today, we will continue the weaning of patients opioid medication with a goal of being opioid free, pending safety and compliance. Pt instructed to call if they experience any signs of withdrawal (diarrhea, nausea, vomiting, sweating or chills, agitation, itching). Today, pt given prescription for  Norco 7.5/325mg up to 3 times a day as needed with a total of 75 tabs to be budgeted over 30 days. Pt instructed to call 5-7 days before they run out of their medications for refill.  At this time pt will be provided with  Norco 7.5/325mg up to 3 times a day as needed with a total of 70 tabs to be budgeted over 30 days, pending safety and compliance. At following refill, pt will be provided with  Norco 7.5/325mg up to 3 times a day as needed with a total of 65 tabs to be budgeted over 30 days, pending safety and compliance. At next office visit, the plan is to provide patient with  Norco 7.5/325mg up to 2 times a day as needed with a total of 60 tabs to be used over 30 days. Their new MME will be 22.5. If patient has difficulty with the wean or difficulty with cravings we will consider referral to mental health for ongoing assessment and treatment for opioid use disorder. TENS and topical capsaicin cream ordered today. Consider NexWave E-Stim device if unable to obtain or ineffective. Aquatic PT ordered today. Pt to follow up with Dr Caro Browne to discuss left intertrochanteric injection. Consider massage therapy or acupuncture therapy in the future. Follow up ongoing assessment and ongoing development of integrative and comprehensive plan of care for chronic pain. Goals: To establish complementary and integrative plan of care to address chronic pain issues while minimizing pharmaceuticals to maximize patient's function improve quality of life. Education:  Patient again educated on the importance of strict compliance with the opioid care agreement while on opioid therapy. Patient also again educated that they should avoid driving while on chronic opioid therapy. Also advised to avoid alcohol and to avoid benzodiazepines while on opioid therapy. Handouts given regarding opioid safety. Follow-up Disposition:  Return in about 3 months (around 3/10/2019) for 30 min. [address] was used for portions of this report. Unintended errors may occur.

## 2018-12-10 NOTE — PATIENT INSTRUCTIONS
Safe Use of Opioid Pain Medicine: Care Instructions  Your Care Instructions  Pain is your body's way of warning you that something is wrong. Pain feels different for everybody. Only you can describe your pain. A doctor can suggest or prescribe many types of medicines for pain. These range from over-the-counter medicines like acetaminophen (Tylenol) to powerful medicines called opioids. Examples of opioids are fentanyl, hydrocodone, morphine, and oxycodone. Heroin is an illegal opioid  Opioids are strong medicines. They can help you manage pain when you use them the right way. But if you misuse them, they can cause serious harm and even death. For these reasons, doctors are very careful about how they prescribe opioids. If you decide to take opioids, here are some things to remember. · Keep your doctor informed. You can get addicted to opioids. The risk is higher if you have a history of substance use. Your doctor will monitor you closely for signs of misuse and addiction and to figure out when you no longer need to take opioids. · Make a treatment plan. The goal of your plan is to be able to function and do the things you need to do, even if you still have some pain. You might be able to manage your pain with other non-opioid options like physical therapy, relaxation, or over-the-counter pain medicines. · Be aware of the side effects. Opioids can cause serious side effects, such as constipation, dry mouth, and nausea. And over time, you may need a higher dose to get pain relief. This is called tolerance. Your body also gets used to opioids. This is called physical dependence. If you suddenly stop taking them, you may have withdrawal symptoms. The doctor carefully considered what pain medicine is right for you. You may not have received opioids if your doctor was concerned about drug interactions or your safety, or if he or she had other concerns. It is best to have one doctor or clinic treat your pain. This way you will get the pain medicine that will help you the most. And a doctor will be able to watch for any problems that the medicine might cause. The doctor has checked you carefully, but problems can develop later. If you notice any problems or new symptoms,  get medical treatment right away. Follow-up care is a key part of your treatment and safety. Be sure to make and go to all appointments, and call your doctor if you are having problems. It's also a good idea to know your test results and keep a list of the medicines you take. How can you care for yourself at home? · If you need to take opioids to manage your pain, remember these safety tips. ? Follow directions carefully. It's easy to misuse opioids if you take a dose other than what's prescribed by your doctor. This can lead to overdose and even death. Even sharing them with someone they weren't meant for is misuse. ? Be cautious. Opioids may affect your judgment and decision making. Do not drive or operate machinery until you can think clearly. Talk with your doctor about when it is safe to drive. ? Reduce the risk of drug interactions. Opioids can be dangerous if you take them with alcohol or with certain drugs like sleeping pills and muscle relaxers. Make sure your doctor knows about all the other medicines you take, including over-the-counter medicines. Don't start any new medicines before you talk to your doctor or pharmacist.  ? Keep others safe. Store opioids in a safe and secure place. Make sure that pets, children, friends, and family can't get to them. When you're done using opioids, make sure to properly dispose of them. You can either use a community drug take-back program or your drugstore's mail-back program. If one of these programs isn't available, you can flush opioid skin patches and unused opioid pills down the toilet. ? Reduce the risk of overdose. Misuse of opioids can be very dangerous.  Protect yourself by asking your doctor about a naloxone rescue kit. It can help you--and even save your life--if you take too much of an opioid. · Try other ways to reduce pain. ? Relax, and reduce stress. Relaxation techniques such as deep breathing or meditation can help. ? Keep moving. Gentle, daily exercise can help reduce pain over the long run. Try low- or no-impact exercises such as walking, swimming, and stationary biking. Do stretches to stay flexible. ? Try heat, cold packs, and massage. ? Get enough sleep. Pain can make you tired and drain your energy. Talk with your doctor if you have trouble sleeping because of pain. ? Think positive. Your thoughts can affect your pain level. Do things that you enjoy to distract yourself when you have pain instead of focusing on the pain. See a movie, read a book, listen to music, or spend time with a friend. · If you are not taking a prescription pain medicine, ask your doctor if you can take an over-the-counter medicine. When should you call for help? Call your doctor now or seek immediate medical care if:    · You have a new kind of pain.     · You have new symptoms, such as a fever or rash, along with the pain.    Watch closely for changes in your health, and be sure to contact your doctor if:    · You think you might be using too much pain medicine, and you need help to use less or stop.     · Your pain gets worse.     · You would like a referral to a doctor or clinic that specializes in pain management. Where can you learn more? Go to http://ruth-theo.info/. Enter R108 in the search box to learn more about \"Safe Use of Opioid Pain Medicine: Care Instructions. \"  Current as of: September 10, 2017  Content Version: 11.8  © 9187-5624 [employer]. Care instructions adapted under license by [employer] (which disclaims liability or warranty for this information).  If you have questions about a medical condition or this instruction, always ask your healthcare professional. [place] [address] any warranty or liability for your use of this information.

## 2019-01-16 DIAGNOSIS — M54.42 CHRONIC MIDLINE LOW BACK PAIN WITH BILATERAL SCIATICA: ICD-10-CM

## 2019-01-16 DIAGNOSIS — G89.29 CHRONIC MIDLINE LOW BACK PAIN WITH BILATERAL SCIATICA: ICD-10-CM

## 2019-01-16 DIAGNOSIS — M54.41 CHRONIC MIDLINE LOW BACK PAIN WITH BILATERAL SCIATICA: ICD-10-CM

## 2019-01-16 DIAGNOSIS — M25.551 BILATERAL HIP PAIN: ICD-10-CM

## 2019-01-16 DIAGNOSIS — M25.552 BILATERAL HIP PAIN: ICD-10-CM

## 2019-01-16 RX ORDER — OXYCODONE AND ACETAMINOPHEN 7.5; 325 MG/1; MG/1
1 TABLET ORAL
Qty: 70 TAB | Refills: 0 | Status: SHIPPED | OUTPATIENT
Start: 2019-01-16 | End: 2019-02-19 | Stop reason: SDUPTHER

## 2019-01-16 NOTE — TELEPHONE ENCOUNTER
Poly Barrios has called requesting a refill of their controlled medication, Oxycodone, for the management of chronic pain. Last office visit date: 12/10/18  Last opioid care agreement 12/10/18  Last UDS was done 12/10/18    Date last  was pulled and reviewed : 1/16/19  Last fill date for medication was 12/19/18    Was the patient compliant when the above report was pulled? yes    Analgesia: Patient reports 80% pain relief on current regimen. Aberrancies: No aberrancies noted in the last 30 days. ADL's: Patient states they are able to perform ADL's on current regimen. Adverse Reaction: Patient reports no adverse reactions at this time. Provider's last note and plan of care reviewed? yes  Request forwarded to provider for review.

## 2019-02-19 DIAGNOSIS — G89.29 CHRONIC MIDLINE LOW BACK PAIN WITH BILATERAL SCIATICA: ICD-10-CM

## 2019-02-19 DIAGNOSIS — M25.551 BILATERAL HIP PAIN: ICD-10-CM

## 2019-02-19 DIAGNOSIS — M54.41 CHRONIC MIDLINE LOW BACK PAIN WITH BILATERAL SCIATICA: ICD-10-CM

## 2019-02-19 DIAGNOSIS — M25.552 BILATERAL HIP PAIN: ICD-10-CM

## 2019-02-19 DIAGNOSIS — M54.42 CHRONIC MIDLINE LOW BACK PAIN WITH BILATERAL SCIATICA: ICD-10-CM

## 2019-02-19 RX ORDER — OXYCODONE AND ACETAMINOPHEN 7.5; 325 MG/1; MG/1
1 TABLET ORAL
Qty: 65 TAB | Refills: 0 | Status: SHIPPED | OUTPATIENT
Start: 2019-02-19 | End: 2019-03-21

## 2019-02-19 NOTE — TELEPHONE ENCOUNTER
Micheletadeola Carrington has called requesting a refill of their controlled medication, Hydrocodone, for the management of chronic pain. Last office visit date: 12/10/18  Last opioid care agreement 12/10/18  Last UDS was done 12/10/18    Date last  was pulled and reviewed : 2/19/19  Last fill date for medication was 1/17/19    Was the patient compliant when the above report was pulled? yes    Analgesia: Patient reports 90% pain relief on current regimen. Aberrancies: No aberrancies noted in the last 30 days. ADL Patient states they are able to perform ADL's on current regimen. Adverse Reaction: Patient reports no adverse reactions at this time. Provider's last note and plan of care reviewed? yes  Request forwarded to provider for review.

## 2019-02-20 ENCOUNTER — TELEPHONE (OUTPATIENT)
Dept: PAIN MANAGEMENT | Age: 84
End: 2019-02-20

## 2019-03-11 ENCOUNTER — OFFICE VISIT (OUTPATIENT)
Dept: PAIN MANAGEMENT | Age: 84
End: 2019-03-11

## 2019-03-11 VITALS
OXYGEN SATURATION: 99 % | HEART RATE: 68 BPM | SYSTOLIC BLOOD PRESSURE: 142 MMHG | DIASTOLIC BLOOD PRESSURE: 63 MMHG | TEMPERATURE: 97 F | BODY MASS INDEX: 30.02 KG/M2 | HEIGHT: 61 IN | RESPIRATION RATE: 16 BRPM | WEIGHT: 159 LBS

## 2019-03-11 DIAGNOSIS — M54.42 CHRONIC MIDLINE LOW BACK PAIN WITH BILATERAL SCIATICA: Primary | ICD-10-CM

## 2019-03-11 DIAGNOSIS — M25.551 BILATERAL HIP PAIN: ICD-10-CM

## 2019-03-11 DIAGNOSIS — M54.41 CHRONIC MIDLINE LOW BACK PAIN WITH BILATERAL SCIATICA: Primary | ICD-10-CM

## 2019-03-11 DIAGNOSIS — Z98.890 S/P LUMBAR LAMINECTOMY: ICD-10-CM

## 2019-03-11 DIAGNOSIS — M16.0 OSTEOARTHRITIS OF BOTH HIPS, UNSPECIFIED OSTEOARTHRITIS TYPE: ICD-10-CM

## 2019-03-11 DIAGNOSIS — M47.816 LUMBAR FACET ARTHROPATHY: ICD-10-CM

## 2019-03-11 DIAGNOSIS — G89.4 CHRONIC PAIN SYNDROME: ICD-10-CM

## 2019-03-11 DIAGNOSIS — G89.29 CHRONIC MIDLINE LOW BACK PAIN WITH BILATERAL SCIATICA: Primary | ICD-10-CM

## 2019-03-11 DIAGNOSIS — Z79.899 ENCOUNTER FOR LONG-TERM (CURRENT) USE OF HIGH-RISK MEDICATION: ICD-10-CM

## 2019-03-11 DIAGNOSIS — M51.36 DDD (DEGENERATIVE DISC DISEASE), LUMBAR: ICD-10-CM

## 2019-03-11 DIAGNOSIS — M25.552 BILATERAL HIP PAIN: ICD-10-CM

## 2019-03-11 RX ORDER — OXYCODONE AND ACETAMINOPHEN 5; 325 MG/1; MG/1
1 TABLET ORAL
Qty: 90 TAB | Refills: 0 | Status: SHIPPED | OUTPATIENT
Start: 2019-03-25 | End: 2019-04-24

## 2019-03-11 RX ORDER — MELOXICAM 7.5 MG/1
7.5 TABLET ORAL 2 TIMES DAILY
COMMUNITY
End: 2020-01-31

## 2019-03-11 NOTE — PROGRESS NOTES
Referral date 11/1/12, source Dr Velma Wilson and for LBP and bilateral leg symptoms. HPI:  Sage Helm is a 82 y.o. female here for f/u visit for ongoing evaluation of chronic lower back pain; she is s/p lumbar laminectomy x2 (1971 & 2010). She also reports left hip pain. She is s/p right JUDITH (2017) and right TKA (2016). Pt was last seen here on 12/10/18. Pt denies interval changes on the character or distribution of pain. Pain is located lumbosacral midline region described as intermittent aching. She also reports intermittent stabbing in her left hip. Pain at its best is 4/10. Pain at its worse is 8/10. The pain is worsened by movement, prolonged standing and prolonged walking. Symptoms are improved by NSAID, oxycodone, TENS unit \"years ago\", \"sitting still\", lidocaine patches, heat/hot shower, aquatic PT and temporarily by steroid injections in the past. PCP took her off Gabapentin due to foot swelling. Pt has tried Lyrica, Tylenol and chiropractor with no perceived benefit. She has not tried acupuncture therapy, massage therapy, TCA or Topamax. She tried Cymbalta \"along time ago\" but unsure why it was stopped. Since last visit, she reports an increased \"soreness\" in her right hip/sciatic region; she was able to see her orthopedic who gave her a cortisone shot, Lidocaine patches, started her on Meloxicam and land based PT. Since then, pt reports she is doing \"better and moving around now\". She has not obtained her TENS unit and reports \"burning sensation\" with capsaicin cream and has therefore stopped using it.        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     Comment: [employer]   Tobacco Use    Smoking status: Former Smoker     Packs/day: 0.50     Years: 5.00     Pack years: 2.50     Types: Cigarettes     Last attempt to quit: 1992     Years since quittin.2    Smokeless tobacco: Never Used   Substance and Sexual Activity    Alcohol use: No    Drug use: No    Sexual activity: Yes     Birth control/protection: Surgical     Comment: post menopausal   Other Topics Concern    Not on file   Social History Narrative    Not on file     Family History   Problem Relation Age of Onset    Hypertension Mother     Stroke Mother     Heart Disease Father     Breast Cancer Daughter 16    Cancer Maternal Aunt         leukemia     Allergies   Allergen Reactions    Statins-Hmg-Coa Reductase Inhibitors Hives    Ezetimibe Hives    Forteo [Teriparatide] Other (comments) and Nausea and Vomiting     abd pains  abd pains    Lipitor [Atorvastatin] Other (comments) and Nausea and Vomiting     myalgias  myalgias    Livalo [Pitavastatin] Other (comments) and Nausea and Vomiting     myalgias  myalgias    Seafood Hives    Shellfish Containing Products Hives    Sulfa (Sulfonamide Antibiotics) Other (comments), Hives and Unknown (comments)     hives    Sulfur Other (comments)     rash    Zocor [Simvastatin] Other (comments) and Nausea and Vomiting     myalgias  myalgias     Past Medical History:   Diagnosis Date    Allergic rhinitis     Chronic pain     Degenerative disk disease     Degenerative joint disease     Degenerative spine disease     Depression     nos, pt. denies    Dyslipidemia     intollerant of statins    Hypertension     Hypertension     Lumbar laminectomy     Osteoporosis     Dr. Shaun Vaughan -Endocrine    Peptic ulcer disease     Peripheral neuropathy     Prediabetes     Pulmonary embolus     2009  bilateral - after back sx    Scoliosis     Shoulder fracture 2013    right (fell off curb)    Sleep apnea     no CPAP (has never used)    Toenail fungus     Urinary incontinence     Vitamin B12 deficiency      Past Surgical History:   Procedure Laterality Date    BREAST SURGERY PROCEDURE UNLISTED Bilateral     x2  breast biopsy, benign fibroadenoma    COLONOSCOPY N/A 4/19/2018    COLONOSCOPY, DIAGNOSTIC performed by Ronald Amin MD at [address] [place] [address] Av HX COLONOSCOPY      2009  repeat 2019    HX GYN      HX KNEE ARTHROSCOPY      Right knee surgery    HX KNEE REPLACEMENT      2014 left    HX KNEE REPLACEMENT Right 12/2016    HX LUMBAR LAMINECTOMY      x2    HX PARTIAL HYSTERECTOMY      partial    HX TOTAL ABDOMINAL HYSTERECTOMY       Current Outpatient Medications on File Prior to Visit   Medication Sig    meloxicam (MOBIC) 7.5 mg tablet Take 7.5 mg by mouth two (2) times a day.  oxyCODONE-acetaminophen (PERCOCET 7.5) 7.5-325 mg per tablet Take 1 Tab by mouth three (3) times daily as needed for Pain (65 tablets to be budgeted over 30 days) for up to 30 days. Max Daily Amount: 3 Tabs.  spironolactone (ALDACTONE) 25 mg tablet Take 25 mg by mouth daily.  hydrOXYzine pamoate (VISTARIL) 25 mg capsule Take 25 mg by mouth three (3) times daily as needed for Itching.  ergocalciferol (VITAMIN D2) 50,000 unit capsule Take 50,000 Units by mouth every seven (7) days.  amLODIPine (NORVASC) 5 mg tablet Take 5 mg by mouth daily.  hydroCHLOROthiazide (HYDRODIURIL) 25 mg tablet Take 25 mg by mouth daily.  aspirin delayed-release 81 mg tablet Take 1 Tab by mouth daily. Start 5/1/2017 (Patient taking differently: Take 162 mg by mouth daily. Start 5/1/2017)    chlorthalidone (HYGROTEN) 25 mg tablet Take 1 Tab by mouth daily. (Patient taking differently: Take 25 mg by mouth daily. Indications: Hypertension)    omeprazole (PRILOSEC) 20 mg capsule Take 20 mg by mouth daily.     carboxymethylcellulose sodium (REFRESH LIQUIGEL) 1 % dlgl Apply 1 Drop to eye as needed. Indications: DRY EYE    TENS Units (TENS 504) george Please provide patient TENS unit device and pads for bilateral hip pain and lower back pain to help decrease pain and medication and improve quality of life and functioning.  naloxone (NARCAN) 4 mg/actuation nasal spray Use 1 spray intranasally, then discard. Repeat with new spray every 2 min as needed for opioid overdose symptoms, alternating nostrils. No current facility-administered medications on file prior to visit. ROS:  Denies fever, chills, nausea, vomiting, diarrhea, constipation, abdominal pain, chest pain, shortness or breath/trouble breathing, weakness, trouble swallowing, changes in vision, changes in hearing, falls, dizziness, bladder incontinence, bowel incontinence, depression, anxiety, suicidal ideations, homicidal ideations or alcohol use. Opioid specific risk: hx DVT/PE, osteoporosis, hx constipation, sleep apnea, PUD, pre-DM. depression. Vitals:    03/11/19 0755   BP: 142/63   Pulse: 68   Resp: 16   Temp: 97 °F (36.1 °C)   TempSrc: Oral   SpO2: 99%   Weight: 72.1 kg (159 lb)   Height: 5' 1\" (1.549 m)   PainSc:   5          Physical exam:  AFVSS, no acute distress, overweight body habitus. A&OXs 3. Normocephalic, atraumatic. Conjugate gaze, clear sclerae. Speech is clear and appropriate. Mood is appropriate and patient is cooperative. Gait Is antalgic. Balance is within functional limits with use of monocane in right hand. Non-labored breathing. LE:   Strength for right lower extremity is 5/5 for hip flexion, knee extension, dorsiflexion, extensor hallucis longus, plantar flexion. Strength for left lower extremity is 5/5 for hip flexion, knee extension, dorsiflexion, extensor hallucis longus, plantar flexion. Sensation to light touch is intact for right L2-S2. Sensation to light touch is intact for left L2-S2.   Negative seated straight leg raise bilaterally. Calculated MEQ - 33.8  Naloxone rescue - yes  Prophylactic bowel program - no  Date of last OCA 12/10/18  Last UDS 12/10/18, consistent   date checked today, findings consistent    Primary Care Physician  Nile Arias, [address] [address] [address]  [phone]    Today   Last Visit  ORT - n/a  0  PGIC - 6 & 3  6 & 3  ROXANA - 42%  47%  COMM - 2  0    PHQ -- .  3 most recent PHQ Screens 3/11/2019   Little interest or pleasure in doing things Not at all   Feeling down, depressed, irritable, or hopeless Not at all   Total Score PHQ 2 0   Trouble falling or staying asleep, or sleeping too much Not at all   Feeling tired or having little energy Not at all   Poor appetite, weight loss, or overeating Not at all   Feeling bad about yourself - or that you are a failure or have let yourself or your family down Not at all   Trouble concentrating on things such as school, work, reading, or watching TV Not at all   Moving or speaking so slowly that other people could have noticed; or the opposite being so fidgety that others notice Not at all   Thoughts of being better off dead, or hurting yourself in some way Not at all   PHQ 9 Score 0   How difficult have these problems made it for you to do your work, take care of your home and get along with others Not difficult at all       DSM V-OUD Screen - deferred    Assessment/Plan:     ICD-10-CM ICD-9-CM    1. Chronic midline low back pain with bilateral sciatica M54.41 724.2 oxyCODONE-acetaminophen (PERCOCET) 5-325 mg per tablet    M54.42 724.3     G89.29 338.29    2. Lumbar facet arthropathy M47.816 721.3 oxyCODONE-acetaminophen (PERCOCET) 5-325 mg per tablet   3. Bilateral hip pain M25.551 719.45 oxyCODONE-acetaminophen (PERCOCET) 5-325 mg per tablet    M25.552     4. Osteoarthritis of both hips, unspecified osteoarthritis type M16.0 715.95 oxyCODONE-acetaminophen (PERCOCET) 5-325 mg per tablet   5.  DDD (degenerative disc disease), lumbar M51.36 722.52 oxyCODONE-acetaminophen (PERCOCET) 5-325 mg per tablet   6. Chronic pain syndrome G89.4 338.4 oxyCODONE-acetaminophen (PERCOCET) 5-325 mg per tablet   7. S/P lumbar laminectomy Z98.890 V45.89 oxyCODONE-acetaminophen (PERCOCET) 5-325 mg per tablet   8. Encounter for long-term (current) use of high-risk medication Z79.899 V58.69         Do not recommend long term opioid therapy for this patient at this time; risks outweigh potential benefits. Pt currently taking Norco 7.5/325mg up to 3 times a day as needed with a total of 65 tabs to be budgeted over 30 days. Their MME is 33.8. Today, we will continue the weaning of patients opioid medication with a goal of being opioid free, pending safety and compliance. Pt instructed to call if they experience any signs of withdrawal (diarrhea, nausea, vomiting, sweating or chills, agitation, itching). Today, pt given prescription for  Norco 5/325mg up to 3 times a day as needed with a total of 90 tabs to be used over 30 days. Their new MME will be 22.5    At next office visit, the plan is to provide patient with  Norco 5/325mg up to 3 times a day as needed with a total of 85 tabs to be budgeted over 30 days. If patient has difficulty with the wean or difficulty with cravings we will consider referral to mental health for ongoing assessment and treatment for opioid use disorder. Pt instructed she can go to [facility] to request TENS unit. Consider NexWave E-Stim device if unable to obtain or ineffective. Stopped topical capsaicin cream. Continue OTC roll-on Aspercreme as needed as this worked better for her. Continue Lidocaine patches as previously recommended. Meloxicam from orthopedics. Finish land based PT ordered by orthopedist. Once finished plan to re-order aquatic PT. Consider left greater trochanteric bursa injection with Dr Keaton Suárez in the future.   Consider massage therapy or acupuncture therapy in the future. Continue follow up with PCP and orthopedics. Follow up ongoing assessment and ongoing development of integrative and comprehensive plan of care for chronic pain. Goals: To establish complementary and integrative plan of care to address chronic pain issues while minimizing pharmaceuticals to maximize patient's function improve quality of life. Education:  Patient again educated on the importance of strict compliance with the opioid care agreement while on opioid therapy. Patient also again educated that they should avoid driving while on chronic opioid therapy. Also advised to avoid alcohol and to avoid benzodiazepines while on opioid therapy. Handouts given regarding opioid safety. Follow-up Disposition:  Return in about 1 month (around 4/11/2019) for 30 min. [address] was used for portions of this report. Unintended errors may occur.

## 2019-03-11 NOTE — PROGRESS NOTES
Nursing Notes    Patient presents to the office today in follow-up. Patient rates her pain at 5/10 on the numerical pain scale. Reviewed medications with counts as follows:    Rx Date filled Qty Dispensed Pill Count Last Dose Short   Percocet 7.5/325 mg 02/23/19 65 18 Last night                                         reviewed YES  Any aberrancies noted on  NO  Last opioid agreement 12/10/18  Last urine drug screen 12/10/18    Comments:     POC UDS was not performed in office today. Any new labs or imaging since last appointment? YES. Pt states she had an x-ray done of her back. This was done by orthopedic. Have you been to an emergency room (ER) or urgent care clinic since your last visit? NO            Have you been hospitalized since your last visit? NO     If yes, where, when, and reason for visit? Have you seen or consulted any other health care providers outside of the [address] [place] [address]  since your last visit? YES     If yes, where, when, and reason for visit? [facility], urology. Pt currently in physical therapy from ortho  Ms. Virgie Falk has a reminder for a \"due or due soon\" health maintenance. I have asked that she contact her primary care provider for follow-up on this health maintenance.     3 most recent PHQ Screens 3/11/2019   Little interest or pleasure in doing things Not at all   Feeling down, depressed, irritable, or hopeless Not at all   Total Score PHQ 2 0   Trouble falling or staying asleep, or sleeping too much Not at all   Feeling tired or having little energy Not at all   Poor appetite, weight loss, or overeating Not at all   Feeling bad about yourself - or that you are a failure or have let yourself or your family down Not at all   Trouble concentrating on things such as school, work, reading, or watching TV Not at all   Moving or speaking so slowly that other people could have noticed; or the opposite being so fidgety that others notice Not at all Thoughts of being better off dead, or hurting yourself in some way Not at all   PHQ 9 Score 0   How difficult have these problems made it for you to do your work, take care of your home and get along with others Not difficult at all

## 2019-03-11 NOTE — PATIENT INSTRUCTIONS

## 2019-04-08 ENCOUNTER — OFFICE VISIT (OUTPATIENT)
Dept: PAIN MANAGEMENT | Age: 84
End: 2019-04-08

## 2019-04-08 VITALS
WEIGHT: 159 LBS | HEART RATE: 66 BPM | HEIGHT: 61 IN | TEMPERATURE: 97 F | BODY MASS INDEX: 30.02 KG/M2 | SYSTOLIC BLOOD PRESSURE: 149 MMHG | OXYGEN SATURATION: 97 % | DIASTOLIC BLOOD PRESSURE: 65 MMHG

## 2019-04-08 DIAGNOSIS — M54.41 CHRONIC MIDLINE LOW BACK PAIN WITH BILATERAL SCIATICA: Primary | ICD-10-CM

## 2019-04-08 DIAGNOSIS — Z98.890 S/P LUMBAR LAMINECTOMY: ICD-10-CM

## 2019-04-08 DIAGNOSIS — M25.552 BILATERAL HIP PAIN: ICD-10-CM

## 2019-04-08 DIAGNOSIS — G89.29 CHRONIC MIDLINE LOW BACK PAIN WITH BILATERAL SCIATICA: Primary | ICD-10-CM

## 2019-04-08 DIAGNOSIS — M47.816 LUMBAR FACET ARTHROPATHY: ICD-10-CM

## 2019-04-08 DIAGNOSIS — M25.551 BILATERAL HIP PAIN: ICD-10-CM

## 2019-04-08 DIAGNOSIS — Z79.899 ENCOUNTER FOR LONG-TERM (CURRENT) USE OF HIGH-RISK MEDICATION: ICD-10-CM

## 2019-04-08 DIAGNOSIS — M51.36 DDD (DEGENERATIVE DISC DISEASE), LUMBAR: ICD-10-CM

## 2019-04-08 DIAGNOSIS — M16.0 OSTEOARTHRITIS OF BOTH HIPS, UNSPECIFIED OSTEOARTHRITIS TYPE: ICD-10-CM

## 2019-04-08 DIAGNOSIS — M54.42 CHRONIC MIDLINE LOW BACK PAIN WITH BILATERAL SCIATICA: Primary | ICD-10-CM

## 2019-04-08 DIAGNOSIS — G89.4 CHRONIC PAIN SYNDROME: ICD-10-CM

## 2019-04-08 RX ORDER — OXYCODONE AND ACETAMINOPHEN 5; 325 MG/1; MG/1
1 TABLET ORAL
Qty: 90 TAB | Refills: 0 | Status: SHIPPED | OUTPATIENT
Start: 2019-04-25 | End: 2019-05-23 | Stop reason: SDUPTHER

## 2019-04-08 NOTE — PROGRESS NOTES
Referral date 11/1/12, source Dr Moriah Mensah and for LBP and bilateral leg symptoms. HPI:  Keon Mcgee is a 82 y.o. female here for f/u visit for ongoing evaluation of chronic lower back pain; she is s/p lumbar laminectomy x2 (1971 & 2010). She also reports left hip pain. She is s/p right JUDITH (2017) and right TKA (2016). Pt was last seen here on 12/10/18. Pt denies interval changes on the character or distribution of pain. Pain is located lumbosacral midline region described as intermittent aching. She also reports intermittent stabbing in her left hip. Pain at its best is 4/10. Pain at its worse is 6/10. The pain is worsened by movement, prolonged standing and prolonged walking. Symptoms are improved by NSAID, oxycodone, TENS unit in the past, lidocaine patches, heat/hot shower, aquatic PT and temporarily by steroid injections in the past. PCP took her off Gabapentin due to foot swelling. Pt has tried Lyrica, Tylenol and chiropractor with no perceived benefit. She has not tried acupuncture therapy, massage therapy, TCA or Topamax. Burning with capsaicin. She tried Cymbalta in past but unsure why it was stopped. Since last visit, she is still in land PT ordered by her orthopedist which is helping greatly. She has not obtained TENS unit currently as she is unable to afford.       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     Comment: [employer]   Social Needs    Financial resource strain: Not on file    Food insecurity:     Worry: Not on file     Inability: Not on file    Transportation needs:     Medical: Not on file     Non-medical: Not on file   Tobacco Use    Smoking status: Former Smoker     Packs/day: 0.50     Years: 5.00     Pack years: 2.50     Types: Cigarettes     Last attempt to quit: 1992     Years since quittin.2    Smokeless tobacco: Never Used   Substance and Sexual Activity    Alcohol use: No    Drug use: No    Sexual activity: Yes     Birth control/protection: Surgical     Comment: post menopausal   Lifestyle    Physical activity:     Days per week: Not on file     Minutes per session: Not on file    Stress: Not on file   Relationships    Social connections:     Talks on phone: Not on file     Gets together: Not on file     Attends Baptism service: Not on file     Active member of club or organization: Not on file     Attends meetings of clubs or organizations: Not on file     Relationship status: Not on file    Intimate partner violence:     Fear of current or ex partner: Not on file     Emotionally abused: Not on file     Physically abused: Not on file     Forced sexual activity: Not on file   Other Topics Concern    Not on file   Social History Narrative    Not on file     Family History   Problem Relation Age of Onset    Hypertension Mother     Stroke Mother     Heart Disease Father     Breast Cancer Daughter 16    Cancer Maternal Aunt         leukemia     Allergies   Allergen Reactions    Statins-Hmg-Coa Reductase Inhibitors Hives    Ezetimibe Hives    Forteo [Teriparatide] Other (comments) and Nausea and Vomiting     abd pains  abd pains    Lipitor [Atorvastatin] Other (comments) and Nausea and Vomiting     myalgias  myalgias    Livalo [Pitavastatin] Other (comments) and Nausea and Vomiting     myalgias  myalgias    Seafood Hives    Shellfish Containing Products Hives    Sulfa (Sulfonamide Antibiotics) Other (comments), Hives and Unknown (comments)     hives    Sulfur Other (comments)     rash    Zocor [Simvastatin] Other (comments) and Nausea and Vomiting     myalgias  myalgias     Past Medical History:   Diagnosis Date    Allergic rhinitis     Chronic pain     Degenerative disk disease     Degenerative joint disease     Degenerative spine disease     Depression     nos, pt. denies    Dyslipidemia     intollerant of statins    Hypertension     Hypertension     Lumbar laminectomy     Osteoporosis     Dr. Li Green -Endocrine    Peptic ulcer disease     Peripheral neuropathy     Prediabetes     Pulmonary embolus     2009  bilateral - after back sx    Scoliosis     Shoulder fracture 2013    right (fell off curb)    Sleep apnea     no CPAP (has never used)    Toenail fungus     Urinary incontinence     Vitamin B12 deficiency      Past Surgical History:   Procedure Laterality Date    BREAST SURGERY PROCEDURE UNLISTED Bilateral     x2  breast biopsy, benign fibroadenoma    COLONOSCOPY N/A 4/19/2018    COLONOSCOPY, DIAGNOSTIC performed by Kodak Weber MD at [address] [place] [address] Ave HX COLONOSCOPY      2009  repeat 2019    HX GYN      HX KNEE ARTHROSCOPY      Right knee surgery    HX KNEE REPLACEMENT      2014 left    HX KNEE REPLACEMENT Right 12/2016    HX LUMBAR LAMINECTOMY      x2    HX PARTIAL HYSTERECTOMY      partial    HX TOTAL ABDOMINAL HYSTERECTOMY       Current Outpatient Medications on File Prior to Visit   Medication Sig    meloxicam (MOBIC) 7.5 mg tablet Take 7.5 mg by mouth two (2) times a day.  oxyCODONE-acetaminophen (PERCOCET) 5-325 mg per tablet Take 1 Tab by mouth three (3) times daily as needed for Pain for up to 30 days. Max Daily Amount: 3 Tabs.  spironolactone (ALDACTONE) 25 mg tablet Take 25 mg by mouth daily.  naloxone (NARCAN) 4 mg/actuation nasal spray Use 1 spray intranasally, then discard. Repeat with new spray every 2 min as needed for opioid overdose symptoms, alternating nostrils.  hydrOXYzine pamoate (VISTARIL) 25 mg capsule Take 25 mg by mouth three (3) times daily as needed for Itching.  ergocalciferol (VITAMIN D2) 50,000 unit capsule Take 50,000 Units by mouth every seven (7) days.  amLODIPine (NORVASC) 5 mg tablet Take 5 mg by mouth daily.  hydroCHLOROthiazide (HYDRODIURIL) 25 mg tablet Take 25 mg by mouth daily.  aspirin delayed-release 81 mg tablet Take 1 Tab by mouth daily. Start 5/1/2017 (Patient taking differently: Take 162 mg by mouth daily. Start 5/1/2017)    chlorthalidone (HYGROTEN) 25 mg tablet Take 1 Tab by mouth daily. (Patient taking differently: Take 25 mg by mouth daily. Indications: Hypertension)    omeprazole (PRILOSEC) 20 mg capsule Take 20 mg by mouth daily.  carboxymethylcellulose sodium (REFRESH LIQUIGEL) 1 % dlgl Apply 1 Drop to eye as needed. Indications: DRY EYE    TENS Units (TENS 504) george Please provide patient TENS unit device and pads for bilateral hip pain and lower back pain to help decrease pain and medication and improve quality of life and functioning. No current facility-administered medications on file prior to visit. ROS:  Denies fever, chills, nausea, vomiting, diarrhea, constipation, abdominal pain, chest pain, shortness or breath/trouble breathing, weakness, trouble swallowing, changes in vision, changes in hearing, falls, dizziness, bladder incontinence, bowel incontinence, depression, anxiety, suicidal ideations, homicidal ideations or alcohol use. Opioid specific risk: hx DVT/PE, osteoporosis, hx constipation, sleep apnea, PUD, pre-DM. depression. Vitals:    04/08/19 1123   BP: 149/65   Pulse: 66   Temp: 97 °F (36.1 °C)   SpO2: 97%   Weight: 72.1 kg (159 lb)   Height: 5' 1\" (1.549 m)   PainSc:   3   PainLoc: Back          Physical exam:  AFVSS, no acute distress, overweight body habitus. A&OXs 3. Normocephalic, atraumatic. Conjugate gaze, clear sclerae. Speech is clear and appropriate. Mood is appropriate and patient is cooperative. Gait is antalgic. Balance is within functional limits with use of monocane in left hand. Bilateral rounded shoulders with slight forward head posture. Non-labored breathing.         Calculated MEQ - 22.5  Naloxone rescue - yes  Prophylactic bowel program - no  Date of last OCA 12/10/18  Last UDS 12/10/18, consistent   date checked today, findings consistent    Primary Care Physician  Johnna Koch, [address] [facility] [address] Sushil Maldonado  [phone]    Today   Last Visit Prior Visit  ORT - n/a  n/a  0  PGIC - 6 & 4  6 & 3  6 & 3  ROXANA - 51%  42%  47%  COMM - 3  2  0    PHQ -- .  3 most recent PHQ Screens 4/8/2019   Little interest or pleasure in doing things Not at all   Feeling down, depressed, irritable, or hopeless Not at all   Total Score PHQ 2 0   Trouble falling or staying asleep, or sleeping too much -   Feeling tired or having little energy -   Poor appetite, weight loss, or overeating -   Feeling bad about yourself - or that you are a failure or have let yourself or your family down -   Trouble concentrating on things such as school, work, reading, or watching TV -   Moving or speaking so slowly that other people could have noticed; or the opposite being so fidgety that others notice -   Thoughts of being better off dead, or hurting yourself in some way -   PHQ 9 Score -   How difficult have these problems made it for you to do your work, take care of your home and get along with others -       DSM V-OUD Screen - deferred    Assessment/Plan:     ICD-10-CM ICD-9-CM    1. Chronic midline low back pain with bilateral sciatica M54.41 724.2 oxyCODONE-acetaminophen (PERCOCET) 5-325 mg per tablet    M54.42 724.3     G89.29 338.29    2. Bilateral hip pain M25.551 719.45 oxyCODONE-acetaminophen (PERCOCET) 5-325 mg per tablet    M25.552     3. Lumbar facet arthropathy M47.816 721.3 oxyCODONE-acetaminophen (PERCOCET) 5-325 mg per tablet   4. Osteoarthritis of both hips, unspecified osteoarthritis type M16.0 715.95 oxyCODONE-acetaminophen (PERCOCET) 5-325 mg per tablet   5. DDD (degenerative disc disease), lumbar M51.36 722.52 oxyCODONE-acetaminophen (PERCOCET) 5-325 mg per tablet   6.  Chronic pain syndrome G89.4 338.4 oxyCODONE-acetaminophen (PERCOCET) 5-325 mg per tablet   7. S/P lumbar laminectomy Z98.890 V45.89 oxyCODONE-acetaminophen (PERCOCET) 5-325 mg per tablet   8. Encounter for long-term (current) use of high-risk medication Z79.899 V58.69         Do not recommend long term opioid therapy for this patient at this time; risks outweigh potential benefits. Pt currently taking Norco 5/325mg up to 3 times a day as needed with a total of 90 tabs to be used over 30 days. Their MME is 22.5. Today, we will continue with this dosing until she has completed land PT by her orthopedist. Pt states she takes on average 1-2 tabs per day depending on what she does but on bad days will take the 3rd tablet. End goal of being opioid free, pending safety and compliance. Pt instructed to call if they experience any signs of withdrawal (diarrhea, nausea, vomiting, sweating or chills, agitation, itching). At next office visit, the plan is to provide patient with Norco 5/325mg up to 3 times a day as needed with a total of 85 tabs to be budgeted over 30 days. If patient has difficulty with the wean or difficulty with cravings we will consider referral to mental health for ongoing assessment and treatment for opioid use disorder. Pt again instructed she can go to medicare web site to request TENS unit or she can go to local medical supply store. She states she is unable to afford it currently. .  Consider NexWave E-Stim device if unable to obtain or ineffective in the future. Stopped topical capsaicin cream. Continue OTC roll-on Aspercreme as needed as this worked better for her. Continue Lidocaine patches as previously recommended. Meloxicam from orthopedics. Finish land based PT ordered by orthopedist. Once finished plan to re-order aquatic PT. Consider left greater trochanteric bursa injection with Dr Mehul Velásquez in the future. Consider massage therapy or acupuncture therapy in the future.   Continue follow up with PCP and orthopedics. Follow up ongoing assessment and ongoing development of integrative and comprehensive plan of care for chronic pain. Goals: To establish complementary and integrative plan of care to address chronic pain issues while minimizing pharmaceuticals to maximize patient's function improve quality of life. Education:  Patient again educated on the importance of strict compliance with the opioid care agreement while on opioid therapy. Patient also again educated that they should avoid driving while on chronic opioid therapy. Also advised to avoid alcohol and to avoid benzodiazepines while on opioid therapy. Handouts given regarding opioid safety. Follow-up and Dispositions    · Return in about 3 months (around 7/8/2019) for 30 min. [address] was used for portions of this report. Unintended errors may occur.

## 2019-04-08 NOTE — PATIENT INSTRUCTIONS

## 2019-04-08 NOTE — PROGRESS NOTES
Nursing Notes    Patient presents to the office today in follow-up. Patient rates her pain at 3/10 on the numerical pain scale. Reviewed medications with counts as follows:    Rx Date filled Qty Dispensed Pill Count Last Dose Short   Perocet 5 mg 03/26/19 90 47 This am  no        reviewed YES  Any aberrancies noted on  NO  Last opioid agreement 12/10/18  Last urine drug screen 12/10/18    Comments:  Patient is here today for a follow up appt today for her chronic low back pain. She states her pain level today is a 3   PHQ 2 was done patient denies any depression. She states she is still in PT at [facility]. Patient states that she doing well with her dose change   Patient states that her bp is elevated due to having a stressful weekend     POC UDS was not performed in office today    Any new labs or imaging since last appointment? NO    Have you been to an emergency room (ER) or urgent care clinic since your last visit? NO            Have you been hospitalized since your last visit? NO     If yes, where, when, and reason for visit? Have you seen or consulted any other health care providers outside of the [facility]  since your last visit? YES   [facility] for PT  If yes, where, when, and reason for visit? Ms. Zeyad Abrams has a reminder for a \"due or due soon\" health maintenance. I have asked that she contact her primary care provider for follow-up on this health maintenance.

## 2019-05-23 DIAGNOSIS — M25.551 BILATERAL HIP PAIN: ICD-10-CM

## 2019-05-23 DIAGNOSIS — G89.29 CHRONIC MIDLINE LOW BACK PAIN WITH BILATERAL SCIATICA: ICD-10-CM

## 2019-05-23 DIAGNOSIS — M47.816 LUMBAR FACET ARTHROPATHY: ICD-10-CM

## 2019-05-23 DIAGNOSIS — M54.42 CHRONIC MIDLINE LOW BACK PAIN WITH BILATERAL SCIATICA: ICD-10-CM

## 2019-05-23 DIAGNOSIS — M51.36 DDD (DEGENERATIVE DISC DISEASE), LUMBAR: ICD-10-CM

## 2019-05-23 DIAGNOSIS — G89.4 CHRONIC PAIN SYNDROME: ICD-10-CM

## 2019-05-23 DIAGNOSIS — M25.552 BILATERAL HIP PAIN: ICD-10-CM

## 2019-05-23 DIAGNOSIS — Z98.890 S/P LUMBAR LAMINECTOMY: ICD-10-CM

## 2019-05-23 DIAGNOSIS — M54.41 CHRONIC MIDLINE LOW BACK PAIN WITH BILATERAL SCIATICA: ICD-10-CM

## 2019-05-23 DIAGNOSIS — M16.0 OSTEOARTHRITIS OF BOTH HIPS, UNSPECIFIED OSTEOARTHRITIS TYPE: ICD-10-CM

## 2019-05-23 RX ORDER — OXYCODONE AND ACETAMINOPHEN 5; 325 MG/1; MG/1
1 TABLET ORAL
Qty: 90 TAB | Refills: 0 | Status: SHIPPED | OUTPATIENT
Start: 2019-05-25 | End: 2019-06-20 | Stop reason: SDUPTHER

## 2019-05-23 NOTE — TELEPHONE ENCOUNTER
Ruslan Gibbs has called requesting a refill of their controlled medication Percocet for the management of chronic midline low back pain    Last office visit date: 04/08/19  Last opioid care agreement 12/10/18  Last UDS was done 12/10/18    Date last  was pulled and reviewed : 05/23/19  Last fill date for medication was 04/25/19    Was the patient compliant when the above report was pulled? yes    Analgesia: The patient states that she receives 80% of pain relief from the medication    Aberrancies: There are no recent aberrancies noted at this time     ADL's: The patient states that she is able to perform her adls while on the medication     Adverse Reaction: There are no adverse reactions noted at this time     Provider's last note and plan of care reviewed? yes  Request forwarded to provider for review.

## 2019-05-23 NOTE — TELEPHONE ENCOUNTER
02:33 I LVM for patient to call back with more information to complete this refill. Phone number was provided.

## 2019-05-24 ENCOUNTER — TELEPHONE (OUTPATIENT)
Dept: PAIN MANAGEMENT | Age: 84
End: 2019-05-24

## 2019-06-20 DIAGNOSIS — M16.0 OSTEOARTHRITIS OF BOTH HIPS, UNSPECIFIED OSTEOARTHRITIS TYPE: ICD-10-CM

## 2019-06-20 DIAGNOSIS — Z98.890 S/P LUMBAR LAMINECTOMY: ICD-10-CM

## 2019-06-20 DIAGNOSIS — M25.552 BILATERAL HIP PAIN: ICD-10-CM

## 2019-06-20 DIAGNOSIS — M47.816 LUMBAR FACET ARTHROPATHY: ICD-10-CM

## 2019-06-20 DIAGNOSIS — M54.41 CHRONIC MIDLINE LOW BACK PAIN WITH BILATERAL SCIATICA: ICD-10-CM

## 2019-06-20 DIAGNOSIS — G89.4 CHRONIC PAIN SYNDROME: ICD-10-CM

## 2019-06-20 DIAGNOSIS — M51.36 DDD (DEGENERATIVE DISC DISEASE), LUMBAR: ICD-10-CM

## 2019-06-20 DIAGNOSIS — G89.29 CHRONIC MIDLINE LOW BACK PAIN WITH BILATERAL SCIATICA: ICD-10-CM

## 2019-06-20 DIAGNOSIS — M25.551 BILATERAL HIP PAIN: ICD-10-CM

## 2019-06-20 DIAGNOSIS — M54.42 CHRONIC MIDLINE LOW BACK PAIN WITH BILATERAL SCIATICA: ICD-10-CM

## 2019-06-20 RX ORDER — OXYCODONE AND ACETAMINOPHEN 5; 325 MG/1; MG/1
1 TABLET ORAL
Qty: 90 TAB | Refills: 0 | Status: SHIPPED | OUTPATIENT
Start: 2019-06-24 | End: 2019-07-09 | Stop reason: SDUPTHER

## 2019-06-20 NOTE — TELEPHONE ENCOUNTER
Charmaine Higginbotham has called requesting a refill of their controlled medication, oxycodone, for the management of back pain. Last office visit date: 4/8/19 with [address] [facility] [address] 7/1/19 with Jerri  Last opioid care agreement 12/14/18  Last UDS was done 12/14/18    Date last  was pulled and reviewed : 6/20/19  Last fill date for medication was 5/25/19    Was the patient compliant when the above report was pulled? yes    Analgesia: 80%    Aberrancies: none    ADL's: yes    Adverse Reaction: no    Provider's last note and plan of care reviewed? yes  Request forwarded to provider for review.

## 2019-07-09 ENCOUNTER — OFFICE VISIT (OUTPATIENT)
Dept: PAIN MANAGEMENT | Age: 84
End: 2019-07-09

## 2019-07-09 VITALS
DIASTOLIC BLOOD PRESSURE: 68 MMHG | OXYGEN SATURATION: 98 % | SYSTOLIC BLOOD PRESSURE: 141 MMHG | TEMPERATURE: 97 F | WEIGHT: 159 LBS | HEIGHT: 61 IN | HEART RATE: 76 BPM | RESPIRATION RATE: 16 BRPM | BODY MASS INDEX: 30.02 KG/M2

## 2019-07-09 DIAGNOSIS — M16.0 OSTEOARTHRITIS OF BOTH HIPS, UNSPECIFIED OSTEOARTHRITIS TYPE: ICD-10-CM

## 2019-07-09 DIAGNOSIS — M54.42 CHRONIC MIDLINE LOW BACK PAIN WITH BILATERAL SCIATICA: Primary | ICD-10-CM

## 2019-07-09 DIAGNOSIS — G89.4 CHRONIC PAIN SYNDROME: ICD-10-CM

## 2019-07-09 DIAGNOSIS — Z98.890 S/P LUMBAR LAMINECTOMY: ICD-10-CM

## 2019-07-09 DIAGNOSIS — M54.41 CHRONIC MIDLINE LOW BACK PAIN WITH BILATERAL SCIATICA: Primary | ICD-10-CM

## 2019-07-09 DIAGNOSIS — G89.29 CHRONIC MIDLINE LOW BACK PAIN WITH BILATERAL SCIATICA: Primary | ICD-10-CM

## 2019-07-09 DIAGNOSIS — M51.36 DDD (DEGENERATIVE DISC DISEASE), LUMBAR: ICD-10-CM

## 2019-07-09 DIAGNOSIS — M47.816 LUMBAR FACET ARTHROPATHY: ICD-10-CM

## 2019-07-09 DIAGNOSIS — Z79.899 ENCOUNTER FOR LONG-TERM (CURRENT) USE OF HIGH-RISK MEDICATION: ICD-10-CM

## 2019-07-09 RX ORDER — OXYCODONE AND ACETAMINOPHEN 5; 325 MG/1; MG/1
1 TABLET ORAL
Qty: 85 TAB | Refills: 0 | Status: SHIPPED | OUTPATIENT
Start: 2019-07-24 | End: 2019-08-20 | Stop reason: SDUPTHER

## 2019-07-09 NOTE — PROGRESS NOTES
Nursing Notes    Patient presents to the office today in follow-up. Patient rates her pain at 3/10 on the numerical pain scale. Reviewed medications with counts as follows:    Rx Date filled Qty Dispensed Pill Count Last Dose Short   Percocet 5/325 6/24/19 90 50 7/8/19 no                                       reviewed YES  Any aberrancies noted on  NO  Last opioid agreement 12/10/18  Last urine drug screen 12/10/18    Comments:     POC UDS was performed in office today    Any new labs or imaging since last appointment? NO    Have you been to an emergency room (ER) or urgent care clinic since your last visit? NO            Have you been hospitalized since your last visit? NO     If yes, where, when, and reason for visit? Have you seen or consulted any other health care providers outside of the [address] [place] [address]  since your last visit? YES   Urology  If yes, where, when, and reason for visit? Ms. Kristy Zavala has a reminder for a \"due or due soon\" health maintenance. I have asked that she contact her primary care provider for follow-up on this health maintenance.     3 most recent PHQ Screens 7/9/2019   Little interest or pleasure in doing things Not at all   Feeling down, depressed, irritable, or hopeless Not at all   Total Score PHQ 2 0   Trouble falling or staying asleep, or sleeping too much -   Feeling tired or having little energy -   Poor appetite, weight loss, or overeating -   Feeling bad about yourself - or that you are a failure or have let yourself or your family down -   Trouble concentrating on things such as school, work, reading, or watching TV -   Moving or speaking so slowly that other people could have noticed; or the opposite being so fidgety that others notice -   Thoughts of being better off dead, or hurting yourself in some way -   PHQ 9 Score -   How difficult have these problems made it for you to do your work, take care of your home and get along with others -

## 2019-07-09 NOTE — PROGRESS NOTES
Referral date 11/1/12, source Dr Tyesha Lucio and for LBP and bilateral leg symptoms. HPI:  Rubbie Scheuermann is a 82 y.o. female here for f/u visit for ongoing evaluation of chronic lower back pain; she is s/p lumbar laminectomy x2 (1971 & 2010). She is s/p right JUDITH (2017) and right TKA (2016). Pt was last seen here on 4/8/19. Pt denies interval changes on the character or distribution of pain. Pain is located lumbosacral midline region described as intermittent aching with numbness in her feet. Pain at its best is 2/10. Pain at its worse is 8/10. The pain is worsened by movement, prolonged standing and prolonged walking. Symptoms are improved by NSAID, oxycodone, TENS unit in the past, lidocaine patches, heat/hot shower, aquatic PT and temporarily by steroid injections in the past. PCP took her off Gabapentin due to foot swelling. Pt has tried Lyrica, Tylenol and chiropractor with no perceived benefit. She has not tried acupuncture therapy, massage therapy, TCA or Topamax. Did not toperate capsaicin. She tried Cymbalta in past but unsure why it was stopped. Since last visit, she continues to be in  land PT ordered by urologist. She is also taking a new medication Myrbetriq. She has not obtained TENS unit currently as she is unable to afford. Overall, she states she feels \"pretty good\".       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     Comment: [employer]   Social Needs    Financial resource strain: Not on file    Food insecurity:     Worry: Not on file     Inability: Not on file    Transportation needs:     Medical: Not on file     Non-medical: Not on file   Tobacco Use    Smoking status: Former Smoker     Packs/day: 0.50     Years: 5.00     Pack years: 2.50     Types: Cigarettes Last attempt to quit: 1992     Years since quittin.5    Smokeless tobacco: Never Used   Substance and Sexual Activity    Alcohol use: No    Drug use: No    Sexual activity: Yes     Birth control/protection: Surgical     Comment: post menopausal   Lifestyle    Physical activity:     Days per week: Not on file     Minutes per session: Not on file    Stress: Not on file   Relationships    Social connections:     Talks on phone: Not on file     Gets together: Not on file     Attends Rastafari service: Not on file     Active member of club or organization: Not on file     Attends meetings of clubs or organizations: Not on file     Relationship status: Not on file    Intimate partner violence:     Fear of current or ex partner: Not on file     Emotionally abused: Not on file     Physically abused: Not on file     Forced sexual activity: Not on file   Other Topics Concern    Not on file   Social History Narrative    Not on file     Family History   Problem Relation Age of Onset    Hypertension Mother     Stroke Mother     Heart Disease Father     Breast Cancer Daughter 16    Cancer Maternal Aunt         leukemia     Allergies   Allergen Reactions    Statins-Hmg-Coa Reductase Inhibitors Hives    Ezetimibe Hives    Forteo [Teriparatide] Other (comments) and Nausea and Vomiting     abd pains  abd pains    Lipitor [Atorvastatin] Other (comments) and Nausea and Vomiting     myalgias  myalgias    Livalo [Pitavastatin] Other (comments) and Nausea and Vomiting     myalgias  myalgias    Seafood Hives    Shellfish Containing Products Hives    Sulfa (Sulfonamide Antibiotics) Other (comments), Hives and Unknown (comments)     hives    Sulfur Other (comments)     rash    Zocor [Simvastatin] Other (comments) and Nausea and Vomiting     myalgias  myalgias     Past Medical History:   Diagnosis Date    Allergic rhinitis     Chronic pain     Degenerative disk disease     Degenerative joint disease  Degenerative spine disease     Depression     nos, pt. denies    Dyslipidemia     intollerant of statins    Hypertension     Hypertension     Lumbar laminectomy     Mixed incontinence     Nocturia     Osteoporosis     Dr. Rina Justice -Endocrine    Peptic ulcer disease     Peripheral neuropathy     Prediabetes     Pulmonary embolus     2009  bilateral - after back sx    Scoliosis     Shoulder fracture 2013    right (fell off curb)    Sleep apnea     no CPAP (has never used)    Toenail fungus     Urinary incontinence     Urinary incontinence without sensory awareness     Vitamin B12 deficiency      Past Surgical History:   Procedure Laterality Date    BREAST SURGERY PROCEDURE UNLISTED Bilateral     x2  breast biopsy, benign fibroadenoma    COLONOSCOPY N/A 4/19/2018    COLONOSCOPY, DIAGNOSTIC performed by Alba Gunn MD at [address] [place] [address] HX COLONOSCOPY      2009  repeat 2019    HX GYN      HX KNEE ARTHROSCOPY      Right knee surgery    HX KNEE REPLACEMENT      2014 left    HX KNEE REPLACEMENT Right 12/2016    HX LUMBAR LAMINECTOMY      x2    HX PARTIAL HYSTERECTOMY      partial    HX TOTAL ABDOMINAL HYSTERECTOMY       Current Outpatient Medications on File Prior to Visit   Medication Sig    lidocaine (LIDODERM) 5 % BRENNAN 1 PA EXT TO THE SKIN Q DAY. MAY WEAR UP TO 12 H    magnesium oxide 250 mg magnesium tablet TK 1 T PO  NIGHTLY    divalproex ER (DEPAKOTE ER) 250 mg ER tablet     mirabegron ER (MYRBETRIQ) 25 mg ER tablet Take 1 Tab by mouth daily.  meloxicam (MOBIC) 7.5 mg tablet Take 7.5 mg by mouth two (2) times a day.  spironolactone (ALDACTONE) 25 mg tablet Take 25 mg by mouth daily.  naloxone (NARCAN) 4 mg/actuation nasal spray Use 1 spray intranasally, then discard. Repeat with new spray every 2 min as needed for opioid overdose symptoms, alternating nostrils.     hydrOXYzine pamoate (VISTARIL) 25 mg capsule Take 25 mg by mouth three (3) times daily as needed for Itching.  ergocalciferol (VITAMIN D2) 50,000 unit capsule Take 50,000 Units by mouth every seven (7) days.  amLODIPine (NORVASC) 5 mg tablet Take 5 mg by mouth daily.  hydroCHLOROthiazide (HYDRODIURIL) 25 mg tablet Take 25 mg by mouth daily.  aspirin delayed-release 81 mg tablet Take 1 Tab by mouth daily. Start 5/1/2017 (Patient taking differently: Take 162 mg by mouth daily. Start 5/1/2017)    chlorthalidone (HYGROTEN) 25 mg tablet Take 1 Tab by mouth daily. (Patient taking differently: Take 25 mg by mouth daily. Indications: Hypertension)    omeprazole (PRILOSEC) 20 mg capsule Take 20 mg by mouth daily.  carboxymethylcellulose sodium (REFRESH LIQUIGEL) 1 % dlgl Apply 1 Drop to eye as needed. Indications: DRY EYE    TENS Units (TENS 504) george Please provide patient TENS unit device and pads for bilateral hip pain and lower back pain to help decrease pain and medication and improve quality of life and functioning. No current facility-administered medications on file prior to visit. ROS:  Denies fever, chills, nausea, vomiting, diarrhea, constipation, abdominal pain, chest pain, shortness or breath/trouble breathing, weakness, trouble swallowing, changes in vision, changes in hearing, falls, dizziness, bladder incontinence, bowel incontinence, depression, anxiety, suicidal ideations, homicidal ideations or alcohol use. Opioid specific risk: hx DVT/PE, osteoporosis, hx constipation, sleep apnea, PUD, pre-DM, depression. Vitals:    07/09/19 0818   BP: 141/68   Pulse: 76   Resp: 16   Temp: 97 °F (36.1 °C)   TempSrc: Oral   SpO2: 98%   Weight: 72.1 kg (159 lb)   Height: 5' 1\" (1.549 m)   PainSc:   3   PainLoc: Back          Physical exam:  AFVSS, no acute distress, endomorphic body habitus. A&OXs 3. Normocephalic, atraumatic. Conjugate gaze, clear sclerae. Speech is clear and appropriate. Mood is appropriate and patient is cooperative. Gait is antalgic. Balance is within functional limits with use of monocane in left hand. Bilateral rounded shoulders with slight forward head posture. Non-labored breathing. TTP midline lumbar and lumbar belt line region. Strength for right lower extremity is 5/5 for hip flexion, knee extension, dorsiflexion, extensor hallucis longus, plantar flexion. Strength for left lower extremity is 5/5 for hip flexion, knee extension, dorsiflexion, extensor hallucis longus, plantar flexion. Sensation to light touch is intact for right L2-S2. Sensation to light touch is intact for left L2-S2.       Calculated MEQ - 22.5  Naloxone rescue - yes  Prophylactic bowel program - no  Date of last OCA 12/10/18  Last UDS today, consistent POC, pending confirmatory testing  Prior UDS 12/10/18, consistent   date checked today, findings consistent    Primary Care Physician  Burt Navarrete  [phone] Sushil Levineia  [phone]    Today   Last Visit Prior Visit(s)  ORT - n/a  n/a  n/a  0  PGIC - 6 & 3  6 & 4  6 & 3  6 & 3  ROXANA - 38%  51%  42%  47%  COMM - 2  3  2  0    PHQ -- .  3 most recent PHQ Screens 7/9/2019   Little interest or pleasure in doing things Not at all   Feeling down, depressed, irritable, or hopeless Not at all   Total Score PHQ 2 0   Trouble falling or staying asleep, or sleeping too much -   Feeling tired or having little energy -   Poor appetite, weight loss, or overeating -   Feeling bad about yourself - or that you are a failure or have let yourself or your family down -   Trouble concentrating on things such as school, work, reading, or watching TV -   Moving or speaking so slowly that other people could have noticed; or the opposite being so fidgety that others notice -   Thoughts of being better off dead, or hurting yourself in some way -   PHQ 9 Score -   How difficult have these problems made it for you to do your work, take care of your home and get along with others -         Assessment/Plan: ICD-10-CM ICD-9-CM    1. Chronic midline low back pain with bilateral sciatica M54.41 724.2 oxyCODONE-acetaminophen (PERCOCET) 5-325 mg per tablet    M54.42 724.3     G89.29 338.29    2. Lumbar facet arthropathy M47.816 721.3 oxyCODONE-acetaminophen (PERCOCET) 5-325 mg per tablet   3. Osteoarthritis of both hips, unspecified osteoarthritis type M16.0 715.95 oxyCODONE-acetaminophen (PERCOCET) 5-325 mg per tablet   4. DDD (degenerative disc disease), lumbar M51.36 722.52 oxyCODONE-acetaminophen (PERCOCET) 5-325 mg per tablet   5. Chronic pain syndrome G89.4 338.4 oxyCODONE-acetaminophen (PERCOCET) 5-325 mg per tablet   6. S/P lumbar laminectomy Z98.890 V45.89 oxyCODONE-acetaminophen (PERCOCET) 5-325 mg per tablet   7. Encounter for long-term (current) use of high-risk medication Z79.899 V58.69 DRUG SCREEN      AMB POC DRUG SCREEN ()        --I do not recommend long term opioid therapy for this patient at this time; risks outweigh potential benefits. Pt currently taking Norco 5/325mg up to 3 times a day as needed with a total of 90 tabs to be used over 30 days. Their MME is 22.5. Pt states she takes on average 1-2 tabs per day depending on what she does but on bad days will take the 3rd tablet  --Today, we will continue the weaning of patients opioid medication with a goal of being opioid free, pending safety and compliance. Pt instructed to call if they experience any signs of withdrawal (diarrhea, nausea, vomiting, sweating or chills, agitation, itching). She is provided with Norco 5/325mg up to 3 times a day as needed with a total of 85 tabs to be budgeted over 30 days. --Pt instructed to call 5-7 days before they run out of their medications for refill. At this time pt will be provided with Norco 5/325mg up to 3 times a day as needed with a total of 85 tabs to be budgeted over 30 days, pending safety and compliance.   --At following refill, pt will be provided with Norco 5/325mg up to 3 times a day as needed with a total of 85 tabs to be budgeted over 30 days, pending safety and compliance. --At next office visit, the plan is to provide patient with Norco 5/325mg up to 3 times a day as needed with a total of 80 tabs to be budgeted over 30 days. If patient has difficulty with the wean or difficulty with cravings we will consider referral to mental health for ongoing assessment and treatment for opioid use disorder. --Consider trial of Cymbalta at next office visit regarding numbness in her feet. Pt will talk to her PCP about trying this medication. Pt states she was unable to complete EMG with her neurologist due to pain. She has tried Lyrica and Gabapentin in the past.   --Has not obtained TENS unit. Discussed with he she can obtain at low cost at local medical supply store or online. --Continue OTC roll-on Aspercreme as needed. --Continue Lidocaine patches as previously recommended. --Meloxicam from orthopedics. --Finish land based PT ordered by orthopedist. Once finished plan to re-order aquatic PT.  --Consider massage therapy or acupuncture therapy in the future. --Continue follow up with PCP and orthopedics. --Follow up ongoing assessment and ongoing development of integrative and comprehensive plan of care for chronic pain. Goals: To establish complementary and integrative plan of care to address chronic pain issues while minimizing pharmaceuticals to maximize patient's function improve quality of life. Education:  Patient again educated on the importance of strict compliance with the opioid care agreement while on opioid therapy. Patient also again educated that they should avoid driving while on chronic opioid therapy. Also advised to avoid alcohol and to avoid benzodiazepines while on opioid therapy. Handouts given regarding opioid safety. Follow-up and Dispositions    · Return in about 3 months (around 10/9/2019) for 30 min.             [address] was used for portions of this report. Unintended errors may occur.

## 2019-07-09 NOTE — PATIENT INSTRUCTIONS

## 2019-08-20 DIAGNOSIS — M51.36 DDD (DEGENERATIVE DISC DISEASE), LUMBAR: ICD-10-CM

## 2019-08-20 DIAGNOSIS — M54.41 CHRONIC MIDLINE LOW BACK PAIN WITH BILATERAL SCIATICA: ICD-10-CM

## 2019-08-20 DIAGNOSIS — M16.0 OSTEOARTHRITIS OF BOTH HIPS, UNSPECIFIED OSTEOARTHRITIS TYPE: ICD-10-CM

## 2019-08-20 DIAGNOSIS — M47.816 LUMBAR FACET ARTHROPATHY: ICD-10-CM

## 2019-08-20 DIAGNOSIS — Z98.890 S/P LUMBAR LAMINECTOMY: ICD-10-CM

## 2019-08-20 DIAGNOSIS — G89.4 CHRONIC PAIN SYNDROME: ICD-10-CM

## 2019-08-20 DIAGNOSIS — M54.42 CHRONIC MIDLINE LOW BACK PAIN WITH BILATERAL SCIATICA: ICD-10-CM

## 2019-08-20 DIAGNOSIS — G89.29 CHRONIC MIDLINE LOW BACK PAIN WITH BILATERAL SCIATICA: ICD-10-CM

## 2019-08-20 NOTE — TELEPHONE ENCOUNTER
Kaylyn Bernheim has called requesting a refill of their controlled medication, percocet, for the management of her chronic back pain. Last office visit date: 07/09/19  Last opioid care agreement 12/10/18  Last UDS was done 07/09/19    Date last  was pulled and reviewed : 08/20/19  Last fill date for medication was 07/24/19 for percocet 5/325 mg #85 tabs    Was the patient compliant when the above report was pulled? yes    Analgesia: pt reports a 70-80% pain relief with her current opioid medication regimen     Aberrancies: none noted     ADL's: pt reports that she is able to perform her normal daily tasks because she is taking her medication. Adverse Reaction: none reported by the pt at this time. Provider's last note and plan of care reviewed? yes  Request forwarded to provider for review.

## 2019-08-21 RX ORDER — OXYCODONE AND ACETAMINOPHEN 5; 325 MG/1; MG/1
1 TABLET ORAL
Qty: 85 TAB | Refills: 0 | Status: SHIPPED | OUTPATIENT
Start: 2019-08-23 | End: 2019-09-19 | Stop reason: SDUPTHER

## 2019-08-22 NOTE — TELEPHONE ENCOUNTER
Patient identified using two patient identifiers (name and ); patient advised prescriptions are ready for pick-up. Patient also informed  hours are Mon-Fri [id number]. Patient verbalized understanding.

## 2019-09-19 DIAGNOSIS — M47.816 LUMBAR FACET ARTHROPATHY: ICD-10-CM

## 2019-09-19 DIAGNOSIS — G89.29 CHRONIC MIDLINE LOW BACK PAIN WITH BILATERAL SCIATICA: ICD-10-CM

## 2019-09-19 DIAGNOSIS — M16.0 OSTEOARTHRITIS OF BOTH HIPS, UNSPECIFIED OSTEOARTHRITIS TYPE: ICD-10-CM

## 2019-09-19 DIAGNOSIS — M54.42 CHRONIC MIDLINE LOW BACK PAIN WITH BILATERAL SCIATICA: ICD-10-CM

## 2019-09-19 DIAGNOSIS — M51.36 DDD (DEGENERATIVE DISC DISEASE), LUMBAR: ICD-10-CM

## 2019-09-19 DIAGNOSIS — Z98.890 S/P LUMBAR LAMINECTOMY: ICD-10-CM

## 2019-09-19 DIAGNOSIS — M54.41 CHRONIC MIDLINE LOW BACK PAIN WITH BILATERAL SCIATICA: ICD-10-CM

## 2019-09-19 RX ORDER — OXYCODONE AND ACETAMINOPHEN 5; 325 MG/1; MG/1
1 TABLET ORAL
Qty: 85 TAB | Refills: 0 | Status: SHIPPED | OUTPATIENT
Start: 2019-09-22 | End: 2019-10-08 | Stop reason: SDUPTHER

## 2019-09-19 NOTE — TELEPHONE ENCOUNTER
Patient identified using two patient identifiers (name and ); patient advised prescriptions are ready for pick-up. Patient also informed  hours are Mon-Fri [phone]. Patient verbalized understanding.

## 2019-09-19 NOTE — TELEPHONE ENCOUNTER
Shana Mendoza has called requesting a refill of their controlled medication, percocet, for the management of her chronic back and joint pain. Last office visit date: 07/09/19  Last opioid care agreement 12/10/18  Last UDS was done 07/09/19    Date last  was pulled and reviewed : 09/19/19  Last fill date for medication was 08/23/19 for percocet 5/325 mg #85 tabs    Was the patient compliant when the above report was pulled? yes    Analgesia: pt reports an 80% pain relief with her current opioid medication regimen     Aberrancies: none noted     ADL's: pt reports that she is able to perform her normal daily tasks because she is taking her medication. Adverse Reaction: none reported by the pt at this time. Provider's last note and plan of care reviewed? yes  Request forwarded to provider for review.

## 2019-10-08 ENCOUNTER — OFFICE VISIT (OUTPATIENT)
Dept: PAIN MANAGEMENT | Age: 84
End: 2019-10-08

## 2019-10-08 VITALS
DIASTOLIC BLOOD PRESSURE: 64 MMHG | TEMPERATURE: 97.2 F | BODY MASS INDEX: 32.1 KG/M2 | HEIGHT: 61 IN | OXYGEN SATURATION: 97 % | WEIGHT: 170 LBS | RESPIRATION RATE: 14 BRPM | SYSTOLIC BLOOD PRESSURE: 140 MMHG | HEART RATE: 67 BPM

## 2019-10-08 DIAGNOSIS — M16.0 OSTEOARTHRITIS OF BOTH HIPS, UNSPECIFIED OSTEOARTHRITIS TYPE: ICD-10-CM

## 2019-10-08 DIAGNOSIS — G89.4 CHRONIC PAIN SYNDROME: ICD-10-CM

## 2019-10-08 DIAGNOSIS — M51.36 DDD (DEGENERATIVE DISC DISEASE), LUMBAR: ICD-10-CM

## 2019-10-08 DIAGNOSIS — Z98.890 S/P LUMBAR LAMINECTOMY: ICD-10-CM

## 2019-10-08 DIAGNOSIS — M54.42 CHRONIC MIDLINE LOW BACK PAIN WITH BILATERAL SCIATICA: Primary | ICD-10-CM

## 2019-10-08 DIAGNOSIS — M47.816 LUMBAR FACET ARTHROPATHY: ICD-10-CM

## 2019-10-08 DIAGNOSIS — G89.29 CHRONIC MIDLINE LOW BACK PAIN WITH BILATERAL SCIATICA: Primary | ICD-10-CM

## 2019-10-08 DIAGNOSIS — Z79.899 ENCOUNTER FOR LONG-TERM (CURRENT) USE OF HIGH-RISK MEDICATION: ICD-10-CM

## 2019-10-08 DIAGNOSIS — M54.41 CHRONIC MIDLINE LOW BACK PAIN WITH BILATERAL SCIATICA: Primary | ICD-10-CM

## 2019-10-08 RX ORDER — SENNOSIDES 8.6 MG/1
1 TABLET ORAL
Qty: 30 TAB | Refills: 2 | Status: SHIPPED | OUTPATIENT
Start: 2019-10-08 | End: 2020-01-31

## 2019-10-08 RX ORDER — OXYCODONE AND ACETAMINOPHEN 5; 325 MG/1; MG/1
1 TABLET ORAL
Qty: 80 TAB | Refills: 0 | Status: SHIPPED | OUTPATIENT
Start: 2019-10-22 | End: 2019-11-18 | Stop reason: SDUPTHER

## 2019-10-08 RX ORDER — DOCUSATE SODIUM 100 MG/1
100 CAPSULE, LIQUID FILLED ORAL
Qty: 60 CAP | Refills: 2 | Status: SHIPPED | OUTPATIENT
Start: 2019-10-08 | End: 2022-05-02

## 2019-10-08 NOTE — PROGRESS NOTES
Referral date 11/1/12, source Dr Celeste Lima and for LBP and bilateral leg symptoms. Calculated MEQ - 22.5  Naloxone rescue - yes  Prophylactic bowel program - yes  Date of last OCA 12/10/18  Last UDS 7/9/19, consistent  Prior UDS 12/10/18, consistent   date checked today, findings consistent      Today  Last Visit Prior Visit(s)  ORT - n/a n/a  n/a  n/a  0  PGIC - 6 & 4 6 & 3  6 & 4  6 & 3  6 & 3  ROXANA - 49% 38%  51%  42%  47%  COMM - 0 2  3  2  0      HPI:  Herbert Lisa is a 82 y.o. female here for f/u visit for ongoing evaluation of chronic lower back pain; she is s/p lumbar laminectomy x2 (1971 & 2010). She is s/p right JUDITH (2017) and right TKA (2016). Pt was last seen here on 7/9/19. Pt denies interval changes on the character or distribution of pain. Pain is located lumbosacral midline region described as intermittent aching with numbness in her feet. Pain at its best is 3-4/10. Pain at its worse is 8/10. The pain is worsened by movement, prolonged standing and prolonged walking. Symptoms are improved by NSAID, oxycodone, TENS unit in the past, lidocaine patches, heat/hot shower, aquatic PT and temporarily by steroid injections in the past. PCP took her off Gabapentin due to foot swelling. Pt has tried Lyrica, Tylenol and chiropractor with no perceived benefit. She has not tried acupuncture therapy, massage therapy, TCA or Topamax. Did not toperate capsaicin. She tried Cymbalta in past but unsure why it was stopped. Since last visit, she reports seeing her cardiology for shortness of breath and has an upcoming [address] on 10/15/19. She has a follow up appt with her urologist on 10/22/19.        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     Comment: [employer] Financial resource strain: Not on file    Food insecurity:     Worry: Not on file     Inability: Not on file    Transportation needs:     Medical: Not on file     Non-medical: Not on file   Tobacco Use    Smoking status: Former Smoker     Packs/day: 0.50     Years: 5.00     Pack years: 2.50     Types: Cigarettes     Last attempt to quit: 1992     Years since quittin.7    Smokeless tobacco: Never Used   Substance and Sexual Activity    Alcohol use: No    Drug use: No    Sexual activity: Yes     Birth control/protection: Surgical     Comment: post menopausal   Lifestyle    Physical activity:     Days per week: Not on file     Minutes per session: Not on file    Stress: Not on file   Relationships    Social connections:     Talks on phone: Not on file     Gets together: Not on file     Attends Orthodoxy service: Not on file     Active member of club or organization: Not on file     Attends meetings of clubs or organizations: Not on file     Relationship status: Not on file    Intimate partner violence:     Fear of current or ex partner: Not on file     Emotionally abused: Not on file     Physically abused: Not on file     Forced sexual activity: Not on file   Other Topics Concern    Not on file   Social History Narrative    Not on file     Family History   Problem Relation Age of Onset    Hypertension Mother     Stroke Mother     Heart Disease Father     Breast Cancer Daughter 16    Cancer Maternal Aunt         leukemia     Allergies   Allergen Reactions    Statins-Hmg-Coa Reductase Inhibitors Hives    Ezetimibe Hives    Forteo [Teriparatide] Other (comments) and Nausea and Vomiting     abd pains  abd pains    Lipitor [Atorvastatin] Other (comments) and Nausea and Vomiting     myalgias  myalgias    Livalo [Pitavastatin] Other (comments) and Nausea and Vomiting     myalgias  myalgias    Seafood Hives    Shellfish Containing Products Hives    Sulfa (Sulfonamide Antibiotics) Other (comments), Hives and Unknown (comments)     hives    Sulfur Other (comments)     rash    Zocor [Simvastatin] Other (comments) and Nausea and Vomiting     myalgias  myalgias     Past Medical History:   Diagnosis Date    Allergic rhinitis     Chronic pain     Degenerative disk disease     Degenerative joint disease     Degenerative spine disease     Depression     nos, pt. denies    Dyslipidemia     intollerant of statins    Frequency of micturition     Hypertension     Hypertension     Lumbar laminectomy     Mixed incontinence     Nocturia     OAB (overactive bladder)     Osteoporosis     Dr. Sky Red -Endocrine    Peptic ulcer disease     Peripheral neuropathy     Prediabetes     Pulmonary embolus     2009  bilateral - after back sx    Scoliosis     Shoulder fracture 2013    right (fell off curb)    Sleep apnea     no CPAP (has never used)    Toenail fungus     Urinary incontinence     Urinary incontinence without sensory awareness     Vitamin B12 deficiency      Past Surgical History:   Procedure Laterality Date    BREAST SURGERY PROCEDURE UNLISTED Bilateral     x2  breast biopsy, benign fibroadenoma    COLONOSCOPY N/A 4/19/2018    COLONOSCOPY, DIAGNOSTIC performed by Juni Guerrero [facility] at [facility] ENDOSCOPY    HX [facility] HX COLONOSCOPY      2009  repeat 2019    HX GYN      HX KNEE ARTHROSCOPY      Right knee surgery    HX KNEE REPLACEMENT      2014 left    HX KNEE REPLACEMENT Right 12/2016    HX LUMBAR LAMINECTOMY      x2    HX PARTIAL HYSTERECTOMY      partial    HX TOTAL ABDOMINAL HYSTERECTOMY       Current Outpatient Medications on File Prior to Visit   Medication Sig    trospium (SANCTURA) 20 mg tablet TAKE 1 TABLET BY MOUTH TWICE DAILY    lidocaine (LIDODERM) 5 % BRENNAN 1 PA EXT TO THE SKIN Q DAY.  MAY WEAR UP TO 12 H    magnesium oxide 250 mg magnesium tablet TK 1 T PO  NIGHTLY    divalproex ER (DEPAKOTE ER) 250 mg ER tablet     spironolactone (ALDACTONE) 25 mg tablet Take 25 mg by mouth daily.  naloxone (NARCAN) 4 mg/actuation nasal spray Use 1 spray intranasally, then discard. Repeat with new spray every 2 min as needed for opioid overdose symptoms, alternating nostrils.  hydrOXYzine pamoate (VISTARIL) 25 mg capsule Take 25 mg by mouth three (3) times daily as needed for Itching.  ergocalciferol (VITAMIN D2) 50,000 unit capsule Take 50,000 Units by mouth every seven (7) days.  amLODIPine (NORVASC) 5 mg tablet Take 5 mg by mouth daily.  hydroCHLOROthiazide (HYDRODIURIL) 25 mg tablet Take 25 mg by mouth daily.  aspirin delayed-release 81 mg tablet Take 1 Tab by mouth daily. Start 5/1/2017 (Patient taking differently: Take 162 mg by mouth daily. Start 5/1/2017)    chlorthalidone (HYGROTEN) 25 mg tablet Take 1 Tab by mouth daily. (Patient taking differently: Take 25 mg by mouth daily. Indications: Hypertension)    omeprazole (PRILOSEC) 20 mg capsule Take 20 mg by mouth daily.  carboxymethylcellulose sodium (REFRESH LIQUIGEL) 1 % dlgl Apply 1 Drop to eye as needed. Indications: DRY EYE    [DISCONTINUED] oxyCODONE-acetaminophen (PERCOCET) 5-325 mg per tablet Take 1 Tab by mouth three (3) times daily as needed (chronic pain - #85 tabs to be budgeted over 30 days) for up to 30 days. Max Daily Amount: 3 Tabs.  [DISCONTINUED] mirabegron ER (MYRBETRIQ) 25 mg ER tablet Take 1 Tab by mouth daily.  meloxicam (MOBIC) 7.5 mg tablet Take 7.5 mg by mouth two (2) times a day.  TENS Units (TENS 504) george Please provide patient TENS unit device and pads for bilateral hip pain and lower back pain to help decrease pain and medication and improve quality of life and functioning. No current facility-administered medications on file prior to visit.          ROS:  Denies fever, chills, nausea, vomiting, diarrhea, constipation, abdominal pain, chest pain, shortness or breath/trouble breathing, weakness, trouble swallowing, changes in vision, changes in hearing, falls, dizziness, bladder incontinence, bowel incontinence, depression, anxiety, suicidal ideations, homicidal ideations or alcohol use. Opioid specific risk: hx DVT/PE, osteoporosis, hx constipation, sleep apnea, PUD, pre-DM, depression. Vitals:    10/08/19 0826   BP: 140/64   Pulse: 67   Resp: 14   Temp: 97.2 °F (36.2 °C)   SpO2: 97%   Weight: 77.1 kg (170 lb)   Height: 5' 1\" (1.549 m)   PainSc:   5   PainLoc: Back          Physical exam:  AFVSS, no acute distress, endomorphic body habitus. A&OXs 3. Normocephalic, atraumatic. Conjugate gaze, clear sclerae. Speech is clear and appropriate. Mood is appropriate and patient is cooperative. Gait is antalgic. Balance is within functional limits with use of monocane in left hand. Bilateral rounded shoulders with slight forward head posture. Non-labored breathing. Primary Care Physician  Barbara Monteiro, [address] [id number] [phone]      PHQ -- .  3 most recent PHQ Screens 10/8/2019   Little interest or pleasure in doing things Not at all   Feeling down, depressed, irritable, or hopeless Not at all   Total Score PHQ 2 0   Trouble falling or staying asleep, or sleeping too much -   Feeling tired or having little energy -   Poor appetite, weight loss, or overeating -   Feeling bad about yourself - or that you are a failure or have let yourself or your family down -   Trouble concentrating on things such as school, work, reading, or watching TV -   Moving or speaking so slowly that other people could have noticed; or the opposite being so fidgety that others notice -   Thoughts of being better off dead, or hurting yourself in some way -   PHQ 9 Score -   How difficult have these problems made it for you to do your work, take care of your home and get along with others -         Assessment/Plan:     ICD-10-CM ICD-9-CM    1.  Chronic midline low back pain with bilateral sciatica M54.41 724.2 oxyCODONE-acetaminophen (PERCOCET) 5-325 mg per tablet    M54.42 724.3     G89.29 338.29    2. Lumbar facet arthropathy M47.816 721.3 oxyCODONE-acetaminophen (PERCOCET) 5-325 mg per tablet   3. Osteoarthritis of both hips, unspecified osteoarthritis type M16.0 715.95 oxyCODONE-acetaminophen (PERCOCET) 5-325 mg per tablet   4. DDD (degenerative disc disease), lumbar M51.36 722.52 oxyCODONE-acetaminophen (PERCOCET) 5-325 mg per tablet   5. S/P lumbar laminectomy Z98.890 V45.89 oxyCODONE-acetaminophen (PERCOCET) 5-325 mg per tablet   6. Chronic pain syndrome G89.4 338.4    7. Encounter for long-term (current) use of high-risk medication Z79.899 V58.69 docusate sodium (COLACE) 100 mg capsule      senna (SENNA) 8.6 mg tablet        1) Medications (opiod and non-opiod)  --I do not recommend long term opioid therapy for this patient at this time; risks outweigh potential benefits. Pt currently taking Norco 5/325mg up to 3 times a day as needed with a total of 85 tabs to be used over 30 days. Their MME is 22.5.  --Today, we will continue the weaning of patients opioid medication with a goal of being opioid free, pending safety and compliance. Pt instructed to call if they experience any signs of withdrawal (diarrhea, nausea, vomiting, sweating or chills, agitation, itching). She is provided with Norco 5/325mg up to 3 times a day as needed with a total of 80 tabs to be budgeted over 30 days. --Pt instructed to call 5-7 days before they run out of their medications for refill. At this time pt will be provided with Norco 5/325mg up to 3 times a day as needed with a total of 80 tabs to be budgeted over 30 days, pending safety and compliance. --At following refill, pt will be provided with Norco 5/325mg up to 3 times a day as needed with a total of 75 tabs to be budgeted over 30 days, pending safety and compliance.   --At next office visit, the plan is to provide patient with Norco 5/325mg up to 3 times a day as needed with a total of 70 tabs to be budgeted over 30 days. If patient has difficulty with the wean or difficulty with cravings we will consider referral to mental health for ongoing assessment and treatment for opioid use disorder. --Consider trial of Cymbalta regarding numbness in her feet. Pt will talk to her PCP about trying this medication. Pt states she was unable to complete EMG with her neurologist due to pain. She has tried Lyrica and Gabapentin in the past.   --Continue OTC roll-on Aspercreme as needed. --Continue Lidocaine patches as needed. --Meloxicam from orthopedics. --Senna and Colace ordered today. 2) Restorative Therapies  --Has not obtained TENS unit. Discussed with he she can obtain at low cost at local medical supply store or online. --She has finish land based PT ordered by orthopedist. She prefers to wait on starting aquatic PT; she declines referral today. Plan to discuss again at her next office visit. --Consider massage therapy or acupuncture therapy in the future. --Consider trial of H-wave therapy at next office visit. 3) Interventional Procedures  --Consider interventional pain procedures with Dr Yousuf Mittal in the future. 4) Behavorial Health Approaches  --Pt would benefit from referral to pain psychology for training and cognitive behavorial therapy, acceptance commitment therapy, biofeedback and mindfulness mediation and other modalities as indicated for treatment of chronic pain once this service is available at our clinic or by referral to Dr Kip Mccabe in the future. 5) Complementary & Integrative Health  --Continue follow up with PCP and orthopedics. --Follow up ongoing assessment and ongoing development of integrative and comprehensive plan of care for chronic pain. Goals: To establish complementary and integrative plan of care to address chronic pain issues while minimizing pharmaceuticals to maximize patient's function improve quality of life.     Education:  Patient again educated on the importance of strict compliance with the opioid care agreement while on opioid therapy. Patient also again educated that they should avoid driving while on chronic opioid therapy. Also advised to avoid alcohol and to avoid benzodiazepines while on opioid therapy. Handouts given regarding opioid safety. Follow-up and Dispositions    · Return in about 3 months (around 1/8/2020) for 30 min. [address] was used for portions of this report. Unintended errors may occur.

## 2019-10-08 NOTE — PROGRESS NOTES
Nursing Notes    Patient presents to the office today in follow-up. Patient rates her pain at 5/10 on the numerical pain scale. Reviewed medications with counts as follows:    Rx Date filled Qty Dispensed Pill Count Last Dose Short   Percocet 5 mg 099/22/19 85 32 This am  no        reviewed YES  Any aberrancies noted on  NO  Last opioid agreement 12/2018  Last urine drug screen 07/09/19    Comments:  Patient is here today for follow up appt today for her chronic low back and leg pain. She states her pain level today is a 5  PHQ 2 was done patient denies any depression. She states she has seen her pcm and Urologist and labs were done there    POC UDS was not performed in office today    Any new labs or imaging since last appointment? YES    Have you been to an emergency room (ER) or urgent care clinic since your last visit? NO            Have you been hospitalized since your last visit? NO     If yes, where, when, and reason for visit? Have you seen or consulted any other health care providers outside of the [address] [place] [address]  since your last visit? YES     If yes, where, when, and reason for visit? Ms. Lorri Calvillo has a reminder for a \"due or due soon\" health maintenance. I have asked that she contact her primary care provider for follow-up on this health maintenance.

## 2019-10-08 NOTE — PATIENT INSTRUCTIONS

## 2019-11-18 DIAGNOSIS — M16.0 OSTEOARTHRITIS OF BOTH HIPS, UNSPECIFIED OSTEOARTHRITIS TYPE: ICD-10-CM

## 2019-11-18 DIAGNOSIS — M47.816 LUMBAR FACET ARTHROPATHY: ICD-10-CM

## 2019-11-18 DIAGNOSIS — M54.41 CHRONIC MIDLINE LOW BACK PAIN WITH BILATERAL SCIATICA: ICD-10-CM

## 2019-11-18 DIAGNOSIS — M51.36 DDD (DEGENERATIVE DISC DISEASE), LUMBAR: ICD-10-CM

## 2019-11-18 DIAGNOSIS — Z98.890 S/P LUMBAR LAMINECTOMY: ICD-10-CM

## 2019-11-18 DIAGNOSIS — G89.29 CHRONIC MIDLINE LOW BACK PAIN WITH BILATERAL SCIATICA: ICD-10-CM

## 2019-11-18 DIAGNOSIS — M54.42 CHRONIC MIDLINE LOW BACK PAIN WITH BILATERAL SCIATICA: ICD-10-CM

## 2019-11-18 NOTE — TELEPHONE ENCOUNTER
Annette Yolanda has called requesting a refill of their controlled medication, percocet, for the management of her chronic back pain. Last office visit date: 10/08/19  Last opioid care agreement 12/14/18  Last UDS was done 07/09/19    Date last  was pulled and reviewed : 11/18/19  Last fill date for medication was 10/22/19 for percocet 5/325 mg #80 tabs     Was the patient compliant when the above report was pulled? yes    Analgesia: pt reports an 80% pain relief with her current opioid medication regimen     Aberrancies: none noted     ADL's: pt reports that she is able to perform her normal daily tasks because she is taking her medication. Adverse Reaction: none reported by the pt at this time. Provider's last note and plan of care reviewed? yes  Request forwarded to provider for review.

## 2019-11-19 RX ORDER — OXYCODONE AND ACETAMINOPHEN 5; 325 MG/1; MG/1
1 TABLET ORAL
Qty: 75 TAB | Refills: 0 | Status: SHIPPED | OUTPATIENT
Start: 2019-11-21 | End: 2019-12-21

## 2019-11-19 NOTE — TELEPHONE ENCOUNTER
Patient identified using two patient identifiers (name and ); patient advised prescriptions are ready for pick-up. Patient also informed  hours are Mon-Fri [id number]. Patient verbalized understanding.

## 2019-11-21 ENCOUNTER — DOCUMENTATION ONLY (OUTPATIENT)
Dept: PAIN MANAGEMENT | Age: 84
End: 2019-11-21

## 2019-11-21 DIAGNOSIS — M47.816 LUMBAR FACET ARTHROPATHY: ICD-10-CM

## 2019-11-21 DIAGNOSIS — G89.29 CHRONIC MIDLINE LOW BACK PAIN WITH BILATERAL SCIATICA: ICD-10-CM

## 2019-11-21 DIAGNOSIS — Z98.890 S/P LUMBAR LAMINECTOMY: ICD-10-CM

## 2019-11-21 DIAGNOSIS — M51.36 DDD (DEGENERATIVE DISC DISEASE), LUMBAR: ICD-10-CM

## 2019-11-21 DIAGNOSIS — M16.0 OSTEOARTHRITIS OF BOTH HIPS, UNSPECIFIED OSTEOARTHRITIS TYPE: ICD-10-CM

## 2019-11-21 DIAGNOSIS — Z79.899 ENCOUNTER FOR LONG-TERM (CURRENT) USE OF HIGH-RISK MEDICATION: Primary | ICD-10-CM

## 2019-11-21 DIAGNOSIS — M54.42 CHRONIC MIDLINE LOW BACK PAIN WITH BILATERAL SCIATICA: ICD-10-CM

## 2019-11-21 DIAGNOSIS — M54.41 CHRONIC MIDLINE LOW BACK PAIN WITH BILATERAL SCIATICA: ICD-10-CM

## 2019-11-21 RX ORDER — HYDROXYZINE 25 MG/1
25 TABLET, FILM COATED ORAL
Qty: 12 TAB | Refills: 0 | Status: SHIPPED | OUTPATIENT
Start: 2019-11-21

## 2019-11-21 RX ORDER — CLONIDINE HYDROCHLORIDE 0.1 MG/1
0.1 TABLET ORAL
Qty: 12 TAB | Refills: 0 | Status: SHIPPED | OUTPATIENT
Start: 2019-11-21 | End: 2022-05-02

## 2019-11-21 RX ORDER — PROMETHAZINE HYDROCHLORIDE 12.5 MG/1
12.5 TABLET ORAL
Qty: 12 TAB | Refills: 0 | Status: SHIPPED | OUTPATIENT
Start: 2019-11-21 | End: 2022-05-02

## 2019-11-21 NOTE — PROGRESS NOTES
Pt presents for prescription pick today. I do not recommend long term opioid therapy for this patient at this time for their chronic pain; the risks outweigh the potential benefits. Patient was educated on signs and symptoms of opioid withdrawal and advised to call the clinic or PCP should these symptoms arise so that we may provide support as needed. They were informed of the planned clinic closure on 12/12/19 and that this will be the last prescription of narcotic provided from this clinic. Patient was educated on signs and symptoms of opioid withdrawal and a withdrawal cocktail was provided to be used if needed. They were also advised they can go to the nearest ED for further support if needed. They were referred to Dr Bonnie Kim and [facility] for continuing of care. They were advised to call their primary care provider to inform them of the planned clinic closure so that they may anticipate providing support for her pain while awaiting establishment at the new clinic. Rapid opiate wean option was presented to patient and they decline.

## 2022-03-11 PROBLEM — M61.9: Status: ACTIVE | Noted: 2019-02-26

## 2022-03-11 PROBLEM — K64.8 INTERNAL HEMORRHOIDS: Status: ACTIVE | Noted: 2022-03-11

## 2022-03-11 PROBLEM — K59.04 CHRONIC IDIOPATHIC CONSTIPATION: Status: ACTIVE | Noted: 2022-03-11

## 2022-03-11 PROBLEM — K63.89 MELANOSIS COLI: Status: ACTIVE | Noted: 2022-03-11

## 2022-03-11 PROBLEM — R15.1 FECAL SMEARING: Status: ACTIVE | Noted: 2022-03-11

## 2022-03-11 PROBLEM — R93.2 ABNORMAL FINDINGS ON DIAGNOSTIC IMAGING OF LIVER AND BILIARY TRACT: Status: ACTIVE | Noted: 2022-03-11

## 2022-03-11 PROBLEM — R10.10 UPPER ABDOMINAL PAIN: Status: ACTIVE | Noted: 2022-03-11

## 2022-03-18 PROBLEM — K64.8 INTERNAL HEMORRHOIDS: Status: ACTIVE | Noted: 2022-03-11

## 2022-03-18 PROBLEM — M16.9 OA (OSTEOARTHRITIS) OF HIP: Status: ACTIVE | Noted: 2017-04-17

## 2022-03-18 PROBLEM — R15.1 FECAL SMEARING: Status: ACTIVE | Noted: 2022-03-11

## 2022-03-18 PROBLEM — M61.9: Status: ACTIVE | Noted: 2019-02-26

## 2022-03-19 PROBLEM — M25.559 HIP PAIN: Status: ACTIVE | Noted: 2018-04-05

## 2022-03-19 PROBLEM — R10.10 UPPER ABDOMINAL PAIN: Status: ACTIVE | Noted: 2022-03-11

## 2022-03-19 PROBLEM — K59.04 CHRONIC IDIOPATHIC CONSTIPATION: Status: ACTIVE | Noted: 2022-03-11

## 2022-03-19 PROBLEM — R93.2 ABNORMAL FINDINGS ON DIAGNOSTIC IMAGING OF LIVER AND BILIARY TRACT: Status: ACTIVE | Noted: 2022-03-11

## 2022-03-19 PROBLEM — M16.11 OSTEOARTHRITIS OF RIGHT HIP: Status: ACTIVE | Noted: 2017-04-17

## 2022-03-20 PROBLEM — K63.89 MELANOSIS COLI: Status: ACTIVE | Noted: 2022-03-11

## 2022-12-29 NOTE — PATIENT INSTRUCTIONS
1. Continue current plan with no evidence of addiction or diversion. Stable on current medication without adverse events. 2. Refill oxycodone 7.5/325 mg 3 times daily as needed. 3. Continue PT for right hip  4. Continue to follow-up with orthopedic surgeon regarding right hip   5. Discussed risks of addiction, dependency, and opioid induced hyperalgesia.    6. Return to clinic in 2 months
No

## 2023-01-26 NOTE — MR AVS SNAPSHOT
[address] [address] 
[phone] Patient: Oscar Lambert MRN: [id number] VKS:[id number]/4/[id number] Visit Information Date & Time Provider Department Dept. Phone Encounter #  
 6/28/2018  8:00 AM Mega Macedo [address] [facility] [id number] Follow-up Instructions Return in about 3 months (around 9/28/2018). Upcoming Health Maintenance Date Due DTaP/Tdap/Td series (1 - Tdap) [birth year]/3/1956 Pneumococcal 65+ Low/Medium Risk (2 of 2 - PPSV23) 11/1/2012 GLAUCOMA SCREENING Q2Y 6/10/2016 MEDICARE YEARLY EXAM 3/14/2018 Influenza Age 5 to Adult 8/1/2018 COLONOSCOPY 4/19/2028 Allergies as of 6/28/2018  Review Complete On: 6/28/2018 By: JULIETTE Booth Severity Noted Reaction Type Reactions Statins-hmg-coa Reductase Inhibitors Medium 04/19/2018   Systemic Hives Ezetimibe  04/18/2018    Hives Forteo [Teriparatide]  09/13/2016    Other (comments), Nausea and Vomiting  
 abd pains 
abd pains Lipitor [Atorvastatin]  02/03/2017    Other (comments), Nausea and Vomiting  
 myalgias 
myalgias Livalo [Pitavastatin]  02/03/2017    Other (comments), Nausea and Vomiting  
 myalgias 
myalgias Seafood  07/03/2011   Systemic Hives Sulfa (Sulfonamide Antibiotics)  10/11/2007    Other (comments), Hives, Unknown (comments)  
 hives Sulfur    Other (comments)  
 rash Zocor [Simvastatin]  02/03/2017    Other (comments), Nausea and Vomiting  
 myalgias 
myalgias Current Immunizations  Reviewed on 8/25/2014 Name Date Pneumococcal Vaccine (Unspecified Type) 11/1/2007 Not reviewed this visit You Were Diagnosed With   
  
 Codes Comments Encounter for long-term (current) use of high-risk medication     ICD-10-CM: Z79.899 ICD-9-CM: V58.69 Chronic pain disorder     ICD-10-CM: G89.4 ICD-9-CM: 338.4 Chronic midline low back pain with bilateral sciatica     ICD-10-CM: M54.41, M54.42, G89.29 ICD-9-CM: 724.2, 724.3, 338.29 Bilateral hip pain     ICD-10-CM: M25.551, M25.552 ICD-9-CM: 719.45 Vitals BP Pulse Temp Resp Height(growth percentile) Weight(growth percentile) 144/68 (BP 1 Location: Left arm, BP Patient Position: Sitting) 68 97.1 °F (36.2 °C) (Oral) 12 5' 1\" (1.549 m) 159 lb (72.1 kg) BMI OB Status Smoking Status 30.04 kg/m2 Hysterectomy Former Smoker Vitals History BMI and BSA Data Body Mass Index Body Surface Area 30.04 kg/m 2 1.76 m 2 Preferred Pharmacy Pharmacy Name Phone [facility], [address] [place] [address] [phone] Your Updated Medication List  
  
   
This list is accurate as of 6/28/18  8:31 AM.  Always use your most recent med list. amLODIPine 5 mg tablet Commonly known as:  Sherrye Acron Take 5 mg by mouth daily. aspirin delayed-release 81 mg tablet Take 1 Tab by mouth daily. Start 5/1/2017  
  
 chlorthalidone 25 mg tablet Commonly known as:  Pennye Durie Take 1 Tab by mouth daily. gabapentin 300 mg capsule Commonly known as:  NEURONTIN Take 100 mg by mouth nightly. hydroCHLOROthiazide 25 mg tablet Commonly known as:  HYDRODIURIL Take 25 mg by mouth daily. hydrOXYzine pamoate 25 mg capsule Commonly known as:  VISTARIL Take 25 mg by mouth three (3) times daily as needed for Itching. omeprazole 20 mg capsule Commonly known as:  PRILOSEC Take 20 mg by mouth daily. * oxyCODONE-acetaminophen 7.5-325 mg per tablet Commonly known as:  PERCOCET 7.5 Take 1 Tab by mouth three (3) times daily as needed for Pain for up to 30 days. Max Daily Amount: 3 Tabs. * oxyCODONE-acetaminophen 7.5-325 mg per tablet Commonly known as:  PERCOCET 7.5 Take 1 Tab by mouth three (3) times daily as needed for Pain for up to 30 days. Max Daily Amount: 3 Tabs. Start taking on:  7/27/2018 * oxyCODONE-acetaminophen 7.5-325 mg per tablet Commonly known as:  PERCOCET 7.5 Take 1 Tab by mouth three (3) times daily as needed for Pain for up to 30 days. Max Daily Amount: 3 Tabs. Start taking on:  8/26/2018  
  
 predniSONE 5 mg tablet Commonly known as:  Kareem Noss Take 4 mg by mouth three (3) times daily. REFRESH LIQUIGEL 1 % Dlgl Generic drug:  carboxymethylcellulose sodium Apply 1 Drop to eye as needed. Indications: DRY EYE  
  
 VITAMIN D2 50,000 unit capsule Generic drug:  ergocalciferol Take 50,000 Units by mouth every seven (7) days. * Notice: This list has 3 medication(s) that are the same as other medications prescribed for you. Read the directions carefully, and ask your doctor or other care provider to review them with you. Prescriptions Printed Refills  
 oxyCODONE-acetaminophen (PERCOCET 7.5) 7.5-325 mg per tablet 0 Sig: Take 1 Tab by mouth three (3) times daily as needed for Pain for up to 30 days. Max Daily Amount: 3 Tabs. Class: Print Route: Oral  
 oxyCODONE-acetaminophen (PERCOCET 7.5) 7.5-325 mg per tablet 0 Starting on: 7/27/2018 Sig: Take 1 Tab by mouth three (3) times daily as needed for Pain for up to 30 days. Max Daily Amount: 3 Tabs. Class: Print Route: Oral  
 oxyCODONE-acetaminophen (PERCOCET 7.5) 7.5-325 mg per tablet 0 Starting on: 8/26/2018 Sig: Take 1 Tab by mouth three (3) times daily as needed for Pain for up to 30 days. Max Daily Amount: 3 Tabs. Class: Print Route: Oral  
  
We Performed the Following AMB POC DRUG SCREEN () [ [facility]] AMB SUPPLY ORDER [[id number] Custom] Comments: H-Wave for home use. 30 days. No substitute. DRUG SCREEN [[id number] Custom] Follow-up Instructions Return in about 3 months (around 9/28/2018). Patient Instructions 1. Continue current plan with no evidence of addiction or diversion. Stable on current medication without adverse events. 2. Refill oxycodone 7.5/325 mg 3 times daily as needed. 3. Continue home therapy for right hip 4. Plan to Schedule appointment with Dr. Marychuy Callahan to discuss possible left intertrochanteric hip injection later when needed. 5. Order H wave therapy 6. Continue to follow-up with orthopedic surgeon regarding right hip 7. Discussed risks of addiction, dependency, and opioid induced hyperalgesia. Please remember to call at least 4-5 business days prior to your medication refill. Return to clinic in 3 months. Please call and cancel your appointment and pain management agreement if you do decide to transfer your care. Introducing hospitals & [facility] SERVICES! Dear Ganga Latif: Thank you for requesting a [employer] account. Our records indicate that you already have an active [employer] account. You can access your account anytime at [email or web address] Did you know that you can access your hospital and ER discharge instructions at any time in [employer]? You can also review all of your test results from your hospital stay or ER visit. Additional Information If you have questions, please visit the Frequently Asked Questions section of the [employer] website at [email or web address] Remember, [employer] is NOT to be used for urgent needs. For medical emergencies, dial 911. Now available from your iPhone and Android! Please provide this summary of care documentation to your next provider. Your primary care clinician is listed as Toshia Nuñez. If you have any questions after today's visit, please call [phone]. Topical Retinoid counseling:  Patient advised to apply a pea-sized amount only at bedtime and wait 30 minutes after washing their face before applying.  If too drying, patient may add a non-comedogenic moisturizer. The patient verbalized understanding of the proper use and possible adverse effects of retinoids.  All of the patient's questions and concerns were addressed.

## 2025-04-02 ENCOUNTER — OFFICE VISIT (OUTPATIENT)
Age: 89
End: 2025-04-02

## 2025-04-02 VITALS — RESPIRATION RATE: 16 BRPM | BODY MASS INDEX: 32.85 KG/M2 | HEIGHT: 61 IN | WEIGHT: 174 LBS

## 2025-04-02 DIAGNOSIS — M70.61 GREATER TROCHANTERIC BURSITIS OF RIGHT HIP: ICD-10-CM

## 2025-04-02 DIAGNOSIS — M79.604 RIGHT LEG PAIN: ICD-10-CM

## 2025-04-02 DIAGNOSIS — Z96.641 H/O TOTAL HIP ARTHROPLASTY, RIGHT: Primary | ICD-10-CM

## 2025-04-02 DIAGNOSIS — Z96.651 H/O TOTAL KNEE REPLACEMENT, RIGHT: ICD-10-CM

## 2025-04-02 DIAGNOSIS — M43.16 SPONDYLOLISTHESIS, LUMBAR REGION: ICD-10-CM

## 2025-04-02 DIAGNOSIS — M81.0 AGE-RELATED OSTEOPOROSIS WITHOUT CURRENT PATHOLOGICAL FRACTURE: ICD-10-CM

## 2025-04-02 DIAGNOSIS — M16.12 PRIMARY OSTEOARTHRITIS OF LEFT HIP: ICD-10-CM

## 2025-04-02 DIAGNOSIS — M48.061 SPINAL STENOSIS AT L4-L5 LEVEL: ICD-10-CM

## 2025-04-02 RX ORDER — BETAMETHASONE SODIUM PHOSPHATE AND BETAMETHASONE ACETATE 3; 3 MG/ML; MG/ML
6 INJECTION, SUSPENSION INTRA-ARTICULAR; INTRALESIONAL; INTRAMUSCULAR; SOFT TISSUE ONCE
Status: SHIPPED | OUTPATIENT
Start: 2025-04-02

## 2025-04-02 NOTE — PROGRESS NOTES
Patient: Jacky Scherer                MRN: [id number]       SSN: [id number]  YOB: 1935        AGE: 89 y.o.        SEX: female  Body mass index is 32.88 kg/m².    PCP: Lenore Sosa DO  04/02/25    Jacyk is here today complaining of low back pain with radiculopathy down the right leg she is very happy with the left knee replacement I did for about 11 years ago Dr. 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 ant EHL and 5- out of 5 bilaterally and Homans' sign is negative no evidence for infection or DVT seen today    This lady's got 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 I recommend an MRI of the lumbar spine referral to the spine center she might be a candidate for an injection I think she is got osteoporosis and recommended DEXA scan and additionally I would recommend nonoperative measures for the time being her age right trochanteric bursa injected as per protocol social determinants of health were again reviewed thank you    REVIEW OF SYSTEMS:      CON: negative  EYE: negative   ENT: negative  RESP: negative  GI:    negative   :  negative  MSK: Positive  A twelve point review of systems was completed, positives noted and all other systems were reviewed and are negative          Past Medical History:   Diagnosis Date    Allergic rhinitis     Benign hypertensive heart disease without heart failure     Chronic pain     Degenerative disk disease     Degenerative joint disease     Degenerative joint disease of

## 2025-05-06 ENCOUNTER — HOSPITAL ENCOUNTER (OUTPATIENT)
Age: 89
Discharge: HOME | End: 2025-05-09
Attending: ORTHOPAEDIC SURGERY
Payer: MEDICARE

## 2025-05-06 DIAGNOSIS — M43.16 SPONDYLOLISTHESIS, LUMBAR REGION: ICD-10-CM

## 2025-05-06 DIAGNOSIS — M81.0 AGE-RELATED OSTEOPOROSIS WITHOUT CURRENT PATHOLOGICAL FRACTURE: ICD-10-CM

## 2025-05-06 DIAGNOSIS — M48.061 SPINAL STENOSIS AT L4-L5 LEVEL: ICD-10-CM

## 2025-05-06 PROCEDURE — 77080 DXA BONE DENSITY AXIAL: CPT

## 2025-05-06 PROCEDURE — 72148 MRI LUMBAR SPINE W/O DYE: CPT
